# Patient Record
Sex: MALE | Employment: OTHER | URBAN - METROPOLITAN AREA
[De-identification: names, ages, dates, MRNs, and addresses within clinical notes are randomized per-mention and may not be internally consistent; named-entity substitution may affect disease eponyms.]

---

## 2017-06-20 ENCOUNTER — HOSPITAL ENCOUNTER (OUTPATIENT)
Dept: MRI IMAGING | Facility: CLINIC | Age: 82
Discharge: HOME | End: 2017-06-20
Attending: PHYSICIAN ASSISTANT | Admitting: PHYSICIAN ASSISTANT
Payer: MEDICARE

## 2017-06-20 DIAGNOSIS — K51.00 ULCERATIVE PANCOLITIS (H): ICD-10-CM

## 2017-06-20 LAB
CREAT BLD-MCNC: 1.1 MG/DL (ref 0.66–1.25)
GFR SERPL CREATININE-BSD FRML MDRD: 65 ML/MIN/1.7M2

## 2017-06-20 PROCEDURE — 82565 ASSAY OF CREATININE: CPT

## 2017-06-20 PROCEDURE — 25000128 H RX IP 250 OP 636: Performed by: PHYSICIAN ASSISTANT

## 2017-06-20 PROCEDURE — A9585 GADOBUTROL INJECTION: HCPCS | Performed by: PHYSICIAN ASSISTANT

## 2017-06-20 PROCEDURE — 25000132 ZZH RX MED GY IP 250 OP 250 PS 637: Mod: GY

## 2017-06-20 PROCEDURE — 72197 MRI PELVIS W/O & W/DYE: CPT

## 2017-06-20 RX ORDER — GADOBUTROL 604.72 MG/ML
15 INJECTION INTRAVENOUS ONCE
Status: COMPLETED | OUTPATIENT
Start: 2017-06-20 | End: 2017-06-20

## 2017-06-20 RX ADMIN — HYOSCYAMINE SULFATE 250 MCG: 0.12 TABLET ORAL at 10:17

## 2017-06-20 RX ADMIN — GADOBUTROL 15 ML: 604.72 INJECTION INTRAVENOUS at 10:17

## 2018-08-01 ENCOUNTER — HOSPITAL ENCOUNTER (OUTPATIENT)
Facility: CLINIC | Age: 83
Discharge: HOME | End: 2018-08-01
Attending: INTERNAL MEDICINE | Admitting: INTERNAL MEDICINE
Payer: MEDICARE

## 2018-08-01 VITALS
SYSTOLIC BLOOD PRESSURE: 115 MMHG | HEIGHT: 69 IN | OXYGEN SATURATION: 97 % | DIASTOLIC BLOOD PRESSURE: 68 MMHG | RESPIRATION RATE: 20 BRPM

## 2018-08-01 LAB — COLONOSCOPY: NORMAL

## 2018-08-01 PROCEDURE — 25000128 H RX IP 250 OP 636: Performed by: INTERNAL MEDICINE

## 2018-08-01 PROCEDURE — 88305 TISSUE EXAM BY PATHOLOGIST: CPT | Mod: 26 | Performed by: INTERNAL MEDICINE

## 2018-08-01 PROCEDURE — G0500 MOD SEDAT ENDO SERVICE >5YRS: HCPCS | Performed by: INTERNAL MEDICINE

## 2018-08-01 PROCEDURE — 45380 COLONOSCOPY AND BIOPSY: CPT | Performed by: INTERNAL MEDICINE

## 2018-08-01 PROCEDURE — 88305 TISSUE EXAM BY PATHOLOGIST: CPT | Performed by: INTERNAL MEDICINE

## 2018-08-01 RX ORDER — LIDOCAINE 40 MG/G
CREAM TOPICAL
Status: DISCONTINUED | OUTPATIENT
Start: 2018-08-01 | End: 2018-08-01 | Stop reason: HOSPADM

## 2018-08-01 RX ORDER — FLUMAZENIL 0.1 MG/ML
0.2 INJECTION, SOLUTION INTRAVENOUS
Status: DISCONTINUED | OUTPATIENT
Start: 2018-08-01 | End: 2018-08-01 | Stop reason: HOSPADM

## 2018-08-01 RX ORDER — NALOXONE HYDROCHLORIDE 0.4 MG/ML
.1-.4 INJECTION, SOLUTION INTRAMUSCULAR; INTRAVENOUS; SUBCUTANEOUS
Status: DISCONTINUED | OUTPATIENT
Start: 2018-08-01 | End: 2018-08-01 | Stop reason: HOSPADM

## 2018-08-01 RX ORDER — ONDANSETRON 2 MG/ML
4 INJECTION INTRAMUSCULAR; INTRAVENOUS
Status: DISCONTINUED | OUTPATIENT
Start: 2018-08-01 | End: 2018-08-01 | Stop reason: HOSPADM

## 2018-08-01 RX ORDER — ONDANSETRON 4 MG/1
4 TABLET, ORALLY DISINTEGRATING ORAL EVERY 6 HOURS PRN
Status: DISCONTINUED | OUTPATIENT
Start: 2018-08-01 | End: 2018-08-01 | Stop reason: HOSPADM

## 2018-08-01 RX ORDER — FENTANYL CITRATE 50 UG/ML
INJECTION, SOLUTION INTRAMUSCULAR; INTRAVENOUS PRN
Status: DISCONTINUED | OUTPATIENT
Start: 2018-08-01 | End: 2018-08-01 | Stop reason: HOSPADM

## 2018-08-01 RX ORDER — ONDANSETRON 2 MG/ML
4 INJECTION INTRAMUSCULAR; INTRAVENOUS EVERY 6 HOURS PRN
Status: DISCONTINUED | OUTPATIENT
Start: 2018-08-01 | End: 2018-08-01 | Stop reason: HOSPADM

## 2018-08-01 NOTE — CONSULTS
"Pre-Endoscopy History and Physical     Shaan Espinoza MRN# [id number]   YOB: 1935 Age: 82 year old     Date of Procedure: 8/1/2018  Primary care provider: Eli Juárez  Type of Endoscopy: colonoscopy  Reason for Procedure: ulcerative pancolitis  Type of Anesthesia Anticipated: Moderate (conscious) sedation    HPI:    Shaan is a 82 year old male who will be undergoing the above procedure.      A history and physical has been performed. The patient's medications and allergies have been reviewed. The risks and benefits of the procedure and the sedation options and risks were discussed with the patient.  All questions were answered and informed consent was obtained.      He denies a personal or family history of anesthesia complications or bleeding disorders.     Allergies   Allergen Reactions     Codeine         Current Facility-Administered Medications   Medication     0.9% sodium chloride BOLUS     lidocaine (LMX4) kit     lidocaine 1 % 1 mL     ondansetron (ZOFRAN) injection 4 mg     sodium chloride (PF) 0.9% PF flush 3 mL     sodium chloride (PF) 0.9% PF flush 3 mL     sodium chloride (PF) 0.9% PF flush 3 mL       Patient Active Problem List   Diagnosis     SBO (small bowel obstruction)        Past Medical History:   Diagnosis Date     Colitis      Ileus (H)      Neuropathy         History reviewed. No pertinent surgical history.    Social History   Substance Use Topics     Smoking status: Former Smoker     Quit date: 10/4/1962     Smokeless tobacco: Never Used     Alcohol use Yes       History reviewed. No pertinent family history.    REVIEW OF SYSTEMS:     5 point ROS negative except as noted above in HPI, including Gen., Resp., CV, GI &  system review.    PHYSICAL EXAM:   Ht 1.753 m (5' 9\") Estimated body mass index is 25.1 kg/(m^2) as calculated from the following:    Height as of 10/4/15: 1.753 m (5' 9\").    Weight as of 10/4/15: 77.1 kg (170 lb).   GENERAL APPEARANCE: healthy  MENTAL " STATUS: alert  AIRWAY EXAM: Mallampatti Class I (visualization of the soft palate, fauces, uvula, anterior and posterior pillars)  RESP: lungs clear to auscultation - no rales, rhonchi or wheezes  CV: regular rates and rhythm    DIAGNOSTICS:      Not indicated    IMPRESSION     ASA Class 2 - Mild systemic disease    PLAN:     Colonoscopy    The above has been forwarded to the consulting provider.    Signed Electronically by: Carlos Whaley  August 1, 2018

## 2018-08-02 LAB — COPATH REPORT: NORMAL

## 2018-08-11 ENCOUNTER — APPOINTMENT (OUTPATIENT)
Dept: CT IMAGING | Facility: CLINIC | Age: 83
DRG: 389 | End: 2018-08-11
Attending: EMERGENCY MEDICINE
Payer: MEDICARE

## 2018-08-11 ENCOUNTER — HOSPITAL ENCOUNTER (INPATIENT)
Facility: CLINIC | Age: 83
LOS: 4 days | Discharge: HOME | DRG: 389 | End: 2018-08-15
Attending: EMERGENCY MEDICINE | Admitting: INTERNAL MEDICINE
Payer: MEDICARE

## 2018-08-11 DIAGNOSIS — K56.609 SBO (SMALL BOWEL OBSTRUCTION) (H): ICD-10-CM

## 2018-08-11 LAB
ALBUMIN SERPL-MCNC: 4.5 G/DL (ref 3.4–5)
ALBUMIN UR-MCNC: NEGATIVE MG/DL
ALP SERPL-CCNC: 99 U/L (ref 40–150)
ALT SERPL W P-5'-P-CCNC: 24 U/L (ref 0–70)
AMORPH CRY #/AREA URNS HPF: ABNORMAL /HPF
ANION GAP SERPL CALCULATED.3IONS-SCNC: 10 MMOL/L (ref 3–14)
APPEARANCE UR: ABNORMAL
AST SERPL W P-5'-P-CCNC: 28 U/L (ref 0–45)
BASOPHILS # BLD AUTO: 0 10E9/L (ref 0–0.2)
BASOPHILS NFR BLD AUTO: 0.2 %
BILIRUB SERPL-MCNC: 1.3 MG/DL (ref 0.2–1.3)
BILIRUB UR QL STRIP: NEGATIVE
BUN SERPL-MCNC: 20 MG/DL (ref 7–30)
CALCIUM SERPL-MCNC: 9.6 MG/DL (ref 8.5–10.1)
CHLORIDE SERPL-SCNC: 98 MMOL/L (ref 94–109)
CO2 BLDCOV-SCNC: 23 MMOL/L (ref 21–28)
CO2 SERPL-SCNC: 25 MMOL/L (ref 20–32)
COLOR UR AUTO: YELLOW
CREAT SERPL-MCNC: 0.94 MG/DL (ref 0.66–1.25)
DIFFERENTIAL METHOD BLD: ABNORMAL
EOSINOPHIL # BLD AUTO: 0 10E9/L (ref 0–0.7)
EOSINOPHIL NFR BLD AUTO: 0.1 %
ERYTHROCYTE [DISTWIDTH] IN BLOOD BY AUTOMATED COUNT: 11.9 % (ref 10–15)
GFR SERPL CREATININE-BSD FRML MDRD: 77 ML/MIN/1.7M2
GLUCOSE SERPL-MCNC: 169 MG/DL (ref 70–99)
GLUCOSE UR STRIP-MCNC: NEGATIVE MG/DL
HCT VFR BLD AUTO: 47.8 % (ref 40–53)
HGB BLD-MCNC: 16.9 G/DL (ref 13.3–17.7)
HGB UR QL STRIP: NEGATIVE
HYALINE CASTS #/AREA URNS LPF: 1 /LPF (ref 0–2)
IMM GRANULOCYTES # BLD: 0.1 10E9/L (ref 0–0.4)
IMM GRANULOCYTES NFR BLD: 0.5 %
KETONES UR STRIP-MCNC: 20 MG/DL
LACTATE BLD-SCNC: 1.6 MMOL/L (ref 0.7–2.1)
LACTATE BLD-SCNC: 2.8 MMOL/L (ref 0.7–2)
LEUKOCYTE ESTERASE UR QL STRIP: NEGATIVE
LIPASE SERPL-CCNC: 180 U/L (ref 73–393)
LYMPHOCYTES # BLD AUTO: 0.6 10E9/L (ref 0.8–5.3)
LYMPHOCYTES NFR BLD AUTO: 4.8 %
MCH RBC QN AUTO: 32.9 PG (ref 26.5–33)
MCHC RBC AUTO-ENTMCNC: 35.4 G/DL (ref 31.5–36.5)
MCV RBC AUTO: 93 FL (ref 78–100)
MONOCYTES # BLD AUTO: 0.4 10E9/L (ref 0–1.3)
MONOCYTES NFR BLD AUTO: 3.4 %
MUCOUS THREADS #/AREA URNS LPF: PRESENT /LPF
NEUTROPHILS # BLD AUTO: 11.4 10E9/L (ref 1.6–8.3)
NEUTROPHILS NFR BLD AUTO: 91 %
NITRATE UR QL: NEGATIVE
NRBC # BLD AUTO: 0 10*3/UL
NRBC BLD AUTO-RTO: 0 /100
PCO2 BLDV: 35 MM HG (ref 40–50)
PH BLDV: 7.42 PH (ref 7.32–7.43)
PH UR STRIP: 8 PH (ref 5–7)
PLATELET # BLD AUTO: 223 10E9/L (ref 150–450)
PO2 BLDV: 34 MM HG (ref 25–47)
POTASSIUM SERPL-SCNC: 3.6 MMOL/L (ref 3.4–5.3)
PROT SERPL-MCNC: 8.5 G/DL (ref 6.8–8.8)
RBC # BLD AUTO: 5.14 10E12/L (ref 4.4–5.9)
RBC #/AREA URNS AUTO: 2 /HPF (ref 0–2)
SAO2 % BLDV FROM PO2: 67 %
SODIUM SERPL-SCNC: 133 MMOL/L (ref 133–144)
SOURCE: ABNORMAL
SP GR UR STRIP: 1.02 (ref 1–1.03)
UROBILINOGEN UR STRIP-MCNC: 0 MG/DL (ref 0–2)
WBC # BLD AUTO: 12.5 10E9/L (ref 4–11)
WBC #/AREA URNS AUTO: 0 /HPF (ref 0–5)

## 2018-08-11 PROCEDURE — 96376 TX/PRO/DX INJ SAME DRUG ADON: CPT

## 2018-08-11 PROCEDURE — 12000000 ZZH R&B MED SURG/OB

## 2018-08-11 PROCEDURE — 99222 1ST HOSP IP/OBS MODERATE 55: CPT | Mod: AI | Performed by: INTERNAL MEDICINE

## 2018-08-11 PROCEDURE — 80053 COMPREHEN METABOLIC PANEL: CPT | Performed by: EMERGENCY MEDICINE

## 2018-08-11 PROCEDURE — 99285 EMERGENCY DEPT VISIT HI MDM: CPT | Mod: 25

## 2018-08-11 PROCEDURE — 25000128 H RX IP 250 OP 636: Performed by: EMERGENCY MEDICINE

## 2018-08-11 PROCEDURE — 96361 HYDRATE IV INFUSION ADD-ON: CPT

## 2018-08-11 PROCEDURE — 81001 URINALYSIS AUTO W/SCOPE: CPT | Performed by: EMERGENCY MEDICINE

## 2018-08-11 PROCEDURE — 85025 COMPLETE CBC W/AUTO DIFF WBC: CPT | Performed by: EMERGENCY MEDICINE

## 2018-08-11 PROCEDURE — 83605 ASSAY OF LACTIC ACID: CPT

## 2018-08-11 PROCEDURE — 25000128 H RX IP 250 OP 636: Performed by: INTERNAL MEDICINE

## 2018-08-11 PROCEDURE — 82803 BLOOD GASES ANY COMBINATION: CPT

## 2018-08-11 PROCEDURE — 96375 TX/PRO/DX INJ NEW DRUG ADDON: CPT

## 2018-08-11 PROCEDURE — 96374 THER/PROPH/DIAG INJ IV PUSH: CPT | Mod: 59

## 2018-08-11 PROCEDURE — 83605 ASSAY OF LACTIC ACID: CPT | Performed by: EMERGENCY MEDICINE

## 2018-08-11 PROCEDURE — 83690 ASSAY OF LIPASE: CPT | Performed by: EMERGENCY MEDICINE

## 2018-08-11 PROCEDURE — 74177 CT ABD & PELVIS W/CONTRAST: CPT

## 2018-08-11 RX ORDER — PROCHLORPERAZINE 25 MG
12.5 SUPPOSITORY, RECTAL RECTAL EVERY 12 HOURS PRN
Status: DISCONTINUED | OUTPATIENT
Start: 2018-08-11 | End: 2018-08-15 | Stop reason: HOSPADM

## 2018-08-11 RX ORDER — ACETAMINOPHEN 325 MG/1
650 TABLET ORAL EVERY 4 HOURS PRN
Status: DISCONTINUED | OUTPATIENT
Start: 2018-08-11 | End: 2018-08-15 | Stop reason: HOSPADM

## 2018-08-11 RX ORDER — LATANOPROST 50 UG/ML
1 SOLUTION/ DROPS OPHTHALMIC DAILY
COMMUNITY

## 2018-08-11 RX ORDER — SODIUM CHLORIDE, SODIUM LACTATE, POTASSIUM CHLORIDE, CALCIUM CHLORIDE 600; 310; 30; 20 MG/100ML; MG/100ML; MG/100ML; MG/100ML
INJECTION, SOLUTION INTRAVENOUS CONTINUOUS
Status: DISCONTINUED | OUTPATIENT
Start: 2018-08-11 | End: 2018-08-15

## 2018-08-11 RX ORDER — NALOXONE HYDROCHLORIDE 0.4 MG/ML
.1-.4 INJECTION, SOLUTION INTRAMUSCULAR; INTRAVENOUS; SUBCUTANEOUS
Status: DISCONTINUED | OUTPATIENT
Start: 2018-08-11 | End: 2018-08-15 | Stop reason: HOSPADM

## 2018-08-11 RX ORDER — SODIUM CHLORIDE 9 MG/ML
1000 INJECTION, SOLUTION INTRAVENOUS CONTINUOUS
Status: DISCONTINUED | OUTPATIENT
Start: 2018-08-11 | End: 2018-08-11

## 2018-08-11 RX ORDER — ONDANSETRON 4 MG/1
4 TABLET, ORALLY DISINTEGRATING ORAL EVERY 6 HOURS PRN
Status: DISCONTINUED | OUTPATIENT
Start: 2018-08-11 | End: 2018-08-15 | Stop reason: HOSPADM

## 2018-08-11 RX ORDER — HYDROMORPHONE HYDROCHLORIDE 1 MG/ML
.3-.5 INJECTION, SOLUTION INTRAMUSCULAR; INTRAVENOUS; SUBCUTANEOUS
Status: DISCONTINUED | OUTPATIENT
Start: 2018-08-11 | End: 2018-08-15 | Stop reason: HOSPADM

## 2018-08-11 RX ORDER — IOPAMIDOL 755 MG/ML
500 INJECTION, SOLUTION INTRAVASCULAR ONCE
Status: COMPLETED | OUTPATIENT
Start: 2018-08-11 | End: 2018-08-11

## 2018-08-11 RX ORDER — ONDANSETRON 2 MG/ML
4 INJECTION INTRAMUSCULAR; INTRAVENOUS EVERY 30 MIN PRN
Status: DISCONTINUED | OUTPATIENT
Start: 2018-08-11 | End: 2018-08-11

## 2018-08-11 RX ORDER — MESALAMINE 500 MG/1
1000 CAPSULE, EXTENDED RELEASE ORAL EVERY MORNING
COMMUNITY

## 2018-08-11 RX ORDER — LIDOCAINE 40 MG/G
CREAM TOPICAL
Status: DISCONTINUED | OUTPATIENT
Start: 2018-08-11 | End: 2018-08-15 | Stop reason: HOSPADM

## 2018-08-11 RX ORDER — ONDANSETRON 2 MG/ML
4 INJECTION INTRAMUSCULAR; INTRAVENOUS EVERY 6 HOURS PRN
Status: DISCONTINUED | OUTPATIENT
Start: 2018-08-11 | End: 2018-08-15 | Stop reason: HOSPADM

## 2018-08-11 RX ORDER — ONDANSETRON 2 MG/ML
4 INJECTION INTRAMUSCULAR; INTRAVENOUS EVERY 30 MIN PRN
Status: COMPLETED | OUTPATIENT
Start: 2018-08-11 | End: 2018-08-11

## 2018-08-11 RX ORDER — OXYCODONE HYDROCHLORIDE 5 MG/1
5-10 TABLET ORAL EVERY 4 HOURS PRN
Status: DISCONTINUED | OUTPATIENT
Start: 2018-08-11 | End: 2018-08-15 | Stop reason: HOSPADM

## 2018-08-11 RX ORDER — HYDROMORPHONE HYDROCHLORIDE 1 MG/ML
0.5 INJECTION, SOLUTION INTRAMUSCULAR; INTRAVENOUS; SUBCUTANEOUS
Status: DISCONTINUED | OUTPATIENT
Start: 2018-08-11 | End: 2018-08-11

## 2018-08-11 RX ORDER — MESALAMINE 500 MG/1
1000 CAPSULE, EXTENDED RELEASE ORAL AT BEDTIME
COMMUNITY

## 2018-08-11 RX ORDER — PROCHLORPERAZINE MALEATE 5 MG
5 TABLET ORAL EVERY 6 HOURS PRN
Status: DISCONTINUED | OUTPATIENT
Start: 2018-08-11 | End: 2018-08-15 | Stop reason: HOSPADM

## 2018-08-11 RX ADMIN — ONDANSETRON 4 MG: 2 INJECTION INTRAMUSCULAR; INTRAVENOUS at 21:06

## 2018-08-11 RX ADMIN — SODIUM CHLORIDE 1000 ML: 9 INJECTION, SOLUTION INTRAVENOUS at 19:40

## 2018-08-11 RX ADMIN — Medication 0.5 MG: at 22:59

## 2018-08-11 RX ADMIN — IOPAMIDOL 83 ML: 755 INJECTION, SOLUTION INTRAVENOUS at 20:41

## 2018-08-11 RX ADMIN — ONDANSETRON 4 MG: 2 INJECTION INTRAMUSCULAR; INTRAVENOUS at 21:58

## 2018-08-11 RX ADMIN — SODIUM CHLORIDE 61 ML: 900 INJECTION, SOLUTION INTRAVENOUS at 20:43

## 2018-08-11 RX ADMIN — ONDANSETRON 4 MG: 2 INJECTION INTRAMUSCULAR; INTRAVENOUS at 19:43

## 2018-08-11 RX ADMIN — SODIUM CHLORIDE, POTASSIUM CHLORIDE, SODIUM LACTATE AND CALCIUM CHLORIDE: 600; 310; 30; 20 INJECTION, SOLUTION INTRAVENOUS at 22:52

## 2018-08-11 RX ADMIN — Medication 0.5 MG: at 19:44

## 2018-08-11 RX ADMIN — SODIUM CHLORIDE, POTASSIUM CHLORIDE, SODIUM LACTATE AND CALCIUM CHLORIDE 1000 ML: 600; 310; 30; 20 INJECTION, SOLUTION INTRAVENOUS at 20:27

## 2018-08-11 RX ADMIN — ONDANSETRON 4 MG: 2 INJECTION INTRAMUSCULAR; INTRAVENOUS at 22:49

## 2018-08-11 ASSESSMENT — ENCOUNTER SYMPTOMS
ROS GI COMMENTS: NEGATIVE: HEMATEMESIS
NAUSEA: 1
DIARRHEA: 1
ABDOMINAL PAIN: 1
VOMITING: 1
BLOOD IN STOOL: 0

## 2018-08-11 NOTE — IP AVS SNAPSHOT
[place] [address] [facility]    [address]    [facility] [address]    Phone:  [phone]    Fax:  [phone]                                       After Visit Summary   8/11/2018    Shaan Espinoza    MRN: [id number]           After Visit Summary Signature Page     I have received my discharge instructions, and my questions have been answered. I have discussed any challenges I see with this plan with the nurse or doctor.    ..........................................................................................................................................  Patient/Patient Representative Signature      ..........................................................................................................................................  Patient Representative Print Name and Relationship to Patient    ..................................................               ................................................  Date                                            Time    ..........................................................................................................................................  Reviewed by Signature/Title    ...................................................              ..............................................  Date                                                            Time

## 2018-08-11 NOTE — IP AVS SNAPSHOT
MRN:[id number]                      After Visit Summary   8/11/2018    Shaan Espinoza    MRN: [id number]           Thank you!     Thank you for choosing [facility] for your care. Our goal is always to provide you with excellent care. Hearing back from our patients is one way we can continue to improve our services. Please take a few minutes to complete the written survey that you may receive in the mail after you visit. If you would like to speak to someone directly about your visit please contact Patient Relations at [phone]. Thank you!          Patient Information     Date Of Birth          1935        Designated Caregiver       Most Recent Value    Caregiver    Will someone help with your care after discharge? no      About your hospital stay     You were admitted on:  August 11, 2018 You last received care in the:  [place] [facility] [facility]    You were discharged on:  August 15, 2018       Who to Call     For medical emergencies, please call 911.  For non-urgent questions about your medical care, please call your primary care provider or clinic, [phone]          Attending Provider     Provider Specialty    Merrill Hua MD Emergency Medicine    Anila, Kristin Wright MD Internal Medicine       Primary Care Provider Office Phone # Fax #    Eli Juárez [phone]      After Care Instructions     Activity       Your activity upon discharge: activity as tolerated            Diet       Follow this diet upon discharge: low fiber diet for 2 weeks.                  Follow-up Appointments     Follow-up and recommended labs and tests        Follow up with primary care provider, Eli Juárez, within 7 days for hospital follow- up.  No follow up labs or test are needed.                  Further instructions from your care team       HOME CARE FOLLOWING BOWEL OBSTRUCTION ADMISSION  GAVIN Kaiser N. Guttormson, D. Maurer, R.  "O Todd     ACTIVITY:  Light Activity -- you may immediately be up and about as tolerated.  Driving -- you may drive when comfortable and off narcotic pain medications.  Light Work -- resume when comfortable off pain medications.  (If you can drive, you probably can work.)  Strenuous Work/Activity -- limit lifting to 15 pounds for 1-2 weeks.  Then, progressively increase with time.  Active Sports (running, biking, etc.) -- cautiously resume after 4 weeks, or when cleared by your surgeon.    DISCOMFORT:  Expect gradual improvement of residual abdominal soreness as your bowel function continues to return to normal over the following 1-2 weeks.  Please contact the office if you have worsening of abdominal pain, or onset of nausea/vomiting.    DIET:  Continue on a \"soft\" diet (i.e. cooked vegetables, soups, processed meats, light/white bread, mashed potatoes, rice, yogurt) for the first one to two week after discharge from the hospital.  After this time, you may return to diet you were on before surgery minimizing high cellulose foods and foods with \"skins\" (i.e.grapes, apple, citrus, corn) only.  This would include limiting: brussel sprouts, cabbage & kale, alfalfa sprouts, zucchini, squash, potatoes, carrots, and yams.  Drink plenty of fluids.    SURGEON CONTACT/APPOINTMENTS:  Office Phone:  [phone]     CONTACT US IF THE FOLLOWING DEVELOPS:   1. A fever that is above 101     2. Severe pain that is not relieved by your prescription.        If you have other questions, please call the office Monday thru Friday between 8am and 5pm to discuss with the nurse or physician assistant.  #[phone]    There is a surgeon ON CALL on weekday evenings and over the weekend in case of urgent need only, and may be contacted at the same number.    If you are having an emergency, call 911 or proceed to your nearest emergency department.    Pending Results     No orders found from 8/9/2018 to 8/12/2018.            Statement " "of Approval     Ordered          08/15/18 1531  I have reviewed and agree with all the recommendations and orders detailed in this document.  EFFECTIVE NOW     Approved and electronically signed by:  Madison Heard MD             Admission Information     Date & Time Provider Department Dept. Phone    2018 Kristin High MD [place] [address] [facility] [phone]      Your Vitals Were     Blood Pressure Pulse Temperature Respirations Height Weight    123/69 (BP Location: Left arm) 83 97.5  F (36.4  C) (Oral) 16 1.753 m (5' 9\") 80 kg (176 lb 4.8 oz)    Pulse Oximetry BMI (Body Mass Index)                94% 26.03 kg/m2          MyChart Information     [employer] lets you send messages to your doctor, view your test results, renew your prescriptions, schedule appointments and more. To sign up, go to [email or web address] . Click on \"Log in\" on the left side of the screen, which will take you to the Welcome page. Then click on \"Sign up Now\" on the right side of the page.     You will be asked to enter the access code listed below, as well as some personal information. Please follow the directions to create your username and password.     Your access code is: [email or web address]  Expires: 2018  3:34 PM     Your access code will  in 90 days. If you need help or a new code, please call your [place] clinic or [phone].        Care EveryWhere ID     This is your Care EveryWhere ID. This could be used by other organizations to access your [place] medical records  [id number]        Equal Access to Services     [facility] : Segun Naidu, lala rodriguez, juan oreilly. So [facility] [phone].    ATENCIÓN: Si habla español, tiene a denise disposición servicios gratuitos de asistencia lingüística. Llame al [phone].    We comply with applicable federal civil rights laws and [place] laws. We do not discriminate on the " basis of race, color, national origin, age, disability, sex, sexual orientation, or gender identity.               Review of your medicines      CONTINUE these medicines which have NOT CHANGED        Dose / Directions    calcium carbonate 500 MG tablet   Commonly known as:  OS-RISSA 500 mg [place]. [place]        Dose:  500 mg   Take 500 mg by mouth daily   Refills:  0       glucosamine-chondroitin 500-400 MG Caps per capsule        Dose:  1 capsule   Take 1 capsule by mouth daily   Refills:  0       latanoprost 0.005 % ophthalmic solution   Commonly known as:  XALATAN        Dose:  1 drop   Place 1 drop into both eyes At Bedtime   Refills:  0       LYRICA PO        Dose:  200 mg   Take 200 mg by mouth 2 times daily as needed   Refills:  0       * PENTASA 500 MG Cpcr CR capsule   Generic drug:  mesalamine        Dose:  1000 mg   Take 1,000 mg by mouth At Bedtime   Refills:  0       * PENTASA 500 MG Cpcr CR capsule   Generic drug:  mesalamine        Dose:  1500 mg   Take 1,500 mg by mouth every morning   Refills:  0       * Notice:  This list has 2 medication(s) that are the same as other medications prescribed for you. Read the directions carefully, and ask your doctor or other care provider to review them with you.      STOP taking     fiber modular packet                    Protect others around you: Learn how to safely use, store and throw away your medicines at www.disposemymeds.org.             Medication List: This is a list of all your medications and when to take them. Check marks below indicate your daily home schedule. Keep this list as a reference.      Medications           Morning Afternoon Evening Bedtime As Needed    calcium carbonate 500 MG tablet   Commonly known as:  OS-RISSA 500 mg [place]. [place]   Take 500 mg by mouth daily                                glucosamine-chondroitin 500-400 MG Caps per capsule   Take 1 capsule by mouth daily                                latanoprost 0.005 % ophthalmic solution    Commonly known as:  XALATAN   Place 1 drop into both eyes At Bedtime   Last time this was given:  1 drop on 8/14/2018  8:55 PM   Next Dose Due:  8/15/18 at bedtime                                   LYRICA PO   Take 200 mg by mouth 2 times daily as needed                                   * PENTASA 500 MG Cpcr CR capsule   Take 1,000 mg by mouth At Bedtime   Last time this was given:  1,500 mg on 8/15/2018  8:36 AM   Generic drug:  mesalamine   Next Dose Due:  8/15/18 at bedtime                                   * PENTASA 500 MG Cpcr CR capsule   Take 1,500 mg by mouth every morning   Last time this was given:  1,500 mg on 8/15/2018  8:36 AM   Generic drug:  mesalamine   Next Dose Due:  8/16/18 in the morning                                   * Notice:  This list has 2 medication(s) that are the same as other medications prescribed for you. Read the directions carefully, and ask your doctor or other care provider to review them with you.

## 2018-08-12 ENCOUNTER — APPOINTMENT (OUTPATIENT)
Dept: GENERAL RADIOLOGY | Facility: CLINIC | Age: 83
DRG: 389 | End: 2018-08-12
Attending: INTERNAL MEDICINE
Payer: MEDICARE

## 2018-08-12 LAB
ANION GAP SERPL CALCULATED.3IONS-SCNC: 8 MMOL/L (ref 3–14)
BASOPHILS # BLD AUTO: 0 10E9/L (ref 0–0.2)
BASOPHILS NFR BLD AUTO: 0.2 %
BUN SERPL-MCNC: 14 MG/DL (ref 7–30)
CALCIUM SERPL-MCNC: 9 MG/DL (ref 8.5–10.1)
CHLORIDE SERPL-SCNC: 106 MMOL/L (ref 94–109)
CO2 SERPL-SCNC: 25 MMOL/L (ref 20–32)
CREAT SERPL-MCNC: 1.01 MG/DL (ref 0.66–1.25)
DIFFERENTIAL METHOD BLD: ABNORMAL
EOSINOPHIL # BLD AUTO: 0 10E9/L (ref 0–0.7)
EOSINOPHIL NFR BLD AUTO: 0.2 %
ERYTHROCYTE [DISTWIDTH] IN BLOOD BY AUTOMATED COUNT: 12.2 % (ref 10–15)
GFR SERPL CREATININE-BSD FRML MDRD: 71 ML/MIN/1.7M2
GLUCOSE SERPL-MCNC: 107 MG/DL (ref 70–99)
HCT VFR BLD AUTO: 46.4 % (ref 40–53)
HGB BLD-MCNC: 16 G/DL (ref 13.3–17.7)
IMM GRANULOCYTES # BLD: 0.1 10E9/L (ref 0–0.4)
IMM GRANULOCYTES NFR BLD: 0.4 %
LYMPHOCYTES # BLD AUTO: 1.9 10E9/L (ref 0.8–5.3)
LYMPHOCYTES NFR BLD AUTO: 15.3 %
MCH RBC QN AUTO: 33.1 PG (ref 26.5–33)
MCHC RBC AUTO-ENTMCNC: 34.5 G/DL (ref 31.5–36.5)
MCV RBC AUTO: 96 FL (ref 78–100)
MONOCYTES # BLD AUTO: 1.2 10E9/L (ref 0–1.3)
MONOCYTES NFR BLD AUTO: 9.7 %
NEUTROPHILS # BLD AUTO: 9 10E9/L (ref 1.6–8.3)
NEUTROPHILS NFR BLD AUTO: 74.2 %
NRBC # BLD AUTO: 0 10*3/UL
NRBC BLD AUTO-RTO: 0 /100
PLATELET # BLD AUTO: 227 10E9/L (ref 150–450)
POTASSIUM SERPL-SCNC: 4.3 MMOL/L (ref 3.4–5.3)
RBC # BLD AUTO: 4.83 10E12/L (ref 4.4–5.9)
SODIUM SERPL-SCNC: 139 MMOL/L (ref 133–144)
WBC # BLD AUTO: 12.1 10E9/L (ref 4–11)

## 2018-08-12 PROCEDURE — 99232 SBSQ HOSP IP/OBS MODERATE 35: CPT | Performed by: HOSPITALIST

## 2018-08-12 PROCEDURE — 12000000 ZZH R&B MED SURG/OB

## 2018-08-12 PROCEDURE — 80048 BASIC METABOLIC PNL TOTAL CA: CPT | Performed by: INTERNAL MEDICINE

## 2018-08-12 PROCEDURE — 74019 RADEX ABDOMEN 2 VIEWS: CPT

## 2018-08-12 PROCEDURE — 36415 COLL VENOUS BLD VENIPUNCTURE: CPT | Performed by: INTERNAL MEDICINE

## 2018-08-12 PROCEDURE — 99221 1ST HOSP IP/OBS SF/LOW 40: CPT | Performed by: SURGERY

## 2018-08-12 PROCEDURE — 25000128 H RX IP 250 OP 636: Performed by: INTERNAL MEDICINE

## 2018-08-12 PROCEDURE — 99207 ZZC CDG-MDM COMPONENT: MEETS LOW - DOWN CODED: CPT | Performed by: HOSPITALIST

## 2018-08-12 PROCEDURE — 85025 COMPLETE CBC W/AUTO DIFF WBC: CPT | Performed by: INTERNAL MEDICINE

## 2018-08-12 PROCEDURE — 25000128 H RX IP 250 OP 636: Performed by: HOSPITALIST

## 2018-08-12 RX ORDER — LORAZEPAM 2 MG/ML
.5-1 INJECTION INTRAMUSCULAR
Status: COMPLETED | OUTPATIENT
Start: 2018-08-12 | End: 2018-08-12

## 2018-08-12 RX ORDER — LATANOPROST 50 UG/ML
1 SOLUTION/ DROPS OPHTHALMIC AT BEDTIME
Status: DISCONTINUED | OUTPATIENT
Start: 2018-08-12 | End: 2018-08-15 | Stop reason: HOSPADM

## 2018-08-12 RX ORDER — PREGABALIN 100 MG/1
200 CAPSULE ORAL 2 TIMES DAILY PRN
Status: DISCONTINUED | OUTPATIENT
Start: 2018-08-12 | End: 2018-08-15 | Stop reason: HOSPADM

## 2018-08-12 RX ADMIN — SODIUM CHLORIDE, POTASSIUM CHLORIDE, SODIUM LACTATE AND CALCIUM CHLORIDE: 600; 310; 30; 20 INJECTION, SOLUTION INTRAVENOUS at 06:51

## 2018-08-12 RX ADMIN — PROCHLORPERAZINE EDISYLATE 5 MG: 5 INJECTION INTRAMUSCULAR; INTRAVENOUS at 10:05

## 2018-08-12 RX ADMIN — ONDANSETRON 4 MG: 2 INJECTION INTRAMUSCULAR; INTRAVENOUS at 15:13

## 2018-08-12 RX ADMIN — SODIUM CHLORIDE, POTASSIUM CHLORIDE, SODIUM LACTATE AND CALCIUM CHLORIDE: 600; 310; 30; 20 INJECTION, SOLUTION INTRAVENOUS at 22:50

## 2018-08-12 RX ADMIN — Medication 0.5 MG: at 10:16

## 2018-08-12 RX ADMIN — PROCHLORPERAZINE EDISYLATE 5 MG: 5 INJECTION INTRAMUSCULAR; INTRAVENOUS at 01:55

## 2018-08-12 RX ADMIN — LORAZEPAM 1 MG: 2 INJECTION INTRAMUSCULAR; INTRAVENOUS at 11:41

## 2018-08-12 RX ADMIN — Medication 0.5 MG: at 04:59

## 2018-08-12 RX ADMIN — SODIUM CHLORIDE, POTASSIUM CHLORIDE, SODIUM LACTATE AND CALCIUM CHLORIDE: 600; 310; 30; 20 INJECTION, SOLUTION INTRAVENOUS at 14:48

## 2018-08-12 RX ADMIN — ONDANSETRON 4 MG: 2 INJECTION INTRAMUSCULAR; INTRAVENOUS at 04:56

## 2018-08-12 ASSESSMENT — ACTIVITIES OF DAILY LIVING (ADL)
ADLS_ACUITY_SCORE: 9
ADLS_ACUITY_SCORE: 11
ADLS_ACUITY_SCORE: 11
ADLS_ACUITY_SCORE: 9

## 2018-08-12 NOTE — PHARMACY-ADMISSION MEDICATION HISTORY
Admission medication history interview status for this patient is complete. See [facility] admission navigator for allergy information, prior to admission medications and immunization status.     Medication history interview source(s):Patient and Family  Medication history resources (including written lists, pill bottles, clinic record):None  [facility]    Changes made to PTA medication list:  Added: latanoprost eye gtts  Deleted: mvi  Changed: Mesalamine to 1500 mg in AM and 1000 mg in PM, Lyrica to prn    Actions taken by pharmacist (provider contacted, etc):None     Additional medication history information:None    Medication reconciliation/reorder completed by provider prior to medication history? No    Do you take OTC medications (eg tylenol, ibuprofen, fish oil, eye/ear drops, etc)? yes(Y/N)    For patients on insulin therapy: no (Y/N)  Lantus/levemir/NPH/Mix 70/30 dose:   (Y/N) (see Med list for doses)   Sliding scale Novolog Y/N  If Yes, do you have a baseline novolog pre-meal dose:  units with meals  Patients eat three meals a day:   Y/N    How many episodes of hypoglycemia do you have per week: _______  How many missed doses do you have per week: ______  How many times do you check your blood glucose per day: _______   Any Barriers to therapy - Be specific :  cost of medications, comfortable with giving injections (if applicable), comfortable and confident with current diabetes regimen: Y/N ______________      Prior to Admission medications    Medication Sig Last Dose Taking? Auth Provider   calcium carbonate (OS-RISSA 500 MG [place]. [place]) 500 MG tablet Take 500 mg by mouth daily  8/11/2018 at Unknown time Yes Reported, Patient   fiber modular (NUTRISOURCE FIBER) packet 1 packet daily with food  8/11/2018 at Unknown time Yes Reported, Patient   glucosamine-chondroitin 500-400 MG CAPS Take 1 capsule by mouth daily 8/11/2018 at Unknown time Yes Reported, Patient   latanoprost (XALATAN) 0.005 %  ophthalmic solution Place 1 drop into both eyes At Bedtime 8/10/2018 at Unknown time Yes Unknown, Entered By History   mesalamine (PENTASA) 500 MG CPCR CR capsule Take 1,000 mg by mouth At Bedtime 8/10/2018 at Unknown time Yes Unknown, Entered By History   mesalamine (PENTASA) 500 MG CPCR CR capsule Take 1,500 mg by mouth every morning 8/11/2018 at Unknown time Yes Unknown, Entered By History   Pregabalin (LYRICA PO) Take 200 mg by mouth 2 times daily as needed    Reported, Patient

## 2018-08-12 NOTE — PLAN OF CARE
Problem: Bowel Obstruction (Adult)  Goal: Signs and Symptoms of Listed Potential Problems Will be Absent, Minimized or Managed (Bowel Obstruction)  Signs and symptoms of listed potential problems will be absent, minimized or managed by discharge/transition of care (reference Bowel Obstruction (Adult) CPG).  Outcome: No Change  Ambulatory Status:  Pt up SBA.  VS:  Elevated BPs  Pain:  C/O  Resp: LS CTA  GI:  Having nausea.  NPO with ice.  BS hypo.    Last BM 1630  :  Voiding adequately  Skin:  WDL  Tx:  NPO with LR @125  Disposition:  Xray tomorrow AM     Will continue to monitor and provide supportive cares.

## 2018-08-12 NOTE — PLAN OF CARE
Problem: Bowel Obstruction (Adult)  Goal: Signs and Symptoms of Listed Potential Problems Will be Absent, Minimized or Managed (Bowel Obstruction)  Signs and symptoms of listed potential problems will be absent, minimized or managed by discharge/transition of care (reference Bowel Obstruction (Adult) CPG).   Ambulatory Status:  Pt up independently  VS:  vss  Pain:  Dilaudid for pain x1  Resp: LS clear  GI:  Zofran and compazine for nausea.  NPO.  BS hypoactive.  Passing flatus.  Last BM 8/11.  Disposition:  tbd    Repeat abdominal xray done this morning.

## 2018-08-12 NOTE — ED TRIAGE NOTES
Patient complains of vomiting and diarrhea today since 12:00. Abdominal cramping. Hx illus. Hypertensive, all other VSS.

## 2018-08-12 NOTE — PLAN OF CARE
Problem: Bowel Obstruction (Adult)  Goal: Signs and Symptoms of Listed Potential Problems Will be Absent, Minimized or Managed (Bowel Obstruction)  Signs and symptoms of listed potential problems will be absent, minimized or managed by discharge/transition of care (reference Bowel Obstruction (Adult) CPG).   Outcome: No Change  Ambulatory Status:  Pt up standby.  Orientation: a/o  VS:  VSS  Pain:  abd pain-IV dilaudid given   Resp: LS clear.   GI:  c/o nausea prior to NG placement-IV compazine given.  NPO with ice chips. +BS.  Passing flatus.  Last BM 8/11. NG placed this shift with 260 output   :  Voiding without difficulty   Labs:  WBC 12.1  Consults:  Surgery   Disposition:  tbd

## 2018-08-12 NOTE — CONSULTS
General Surgery Consultation    Shaan Espinoza MRN# [id number]   Age: 82 year old YOB: 1935     Date of Admission:  8/11/2018    Reason for consult:            Abdominal pain       Requesting physician:            Dr. High                Assessment and Plan:   Assessment:   Shaan Espinoza is a 82 year old male with a small bowel obstruction.    Comorbidities:   has a past medical history of Colitis; Ileus (H); and Neuropathy.        Plan:   Continue conservative management with bowel rest, npo, IVF, and NG tube decompression.  Surgical intervention may be needed if the clinical picture worsens or if the patient fails to progress with conservative measure.  Plan and option of surgical intervention reviewed with the pt and his wife.  They are agreeable to NG tube placement and continued observation.                 Chief Complaint:   Abdominal pain    History is obtained from the patient and his wife.         History of Present Illness:   Shaan Espinoza is a 82 year old  male without a history of abdominal surgery and with a history of ulcerative pancolitis and recurrent episodes of ileus approximately once yearly who presents with abdominal pain diffusely for the past 20 hours.  The patient had a surveillance colonoscopy 11 days ago which did not show active colitis.  In addition, random mucosal biopsies do not show inflammation.  The pain is constant and sharp and cramping.  Associated symptoms include with nausea and vomiting.  Negative for associated symptoms of diarrhea, fever and chills.  Last bowel movement was yesterday at 4pm, scant and loose.  Last flatus was unknown.  At baseline BMs are formed and q 2-3 days.  He does have a history of bowel obstructions in the past.  He was hospitalized in 2009, 2011, 2015 for sbo's and treated with ng tube decompression.  He has brief episodes not requiring hospitalization q 6mo-1yr that last 2-4 hours.  The pt had an MR enterography 1 yr  "ago which did not reveal any small bowel abnormality.           Past Medical History:    has a past medical history of Colitis; Ileus (H); and Neuropathy.          Past Surgical History:     Past Surgical History:   Procedure Laterality Date     COLONOSCOPY N/A 8/1/2018    Procedure: COMBINED COLONOSCOPY, SINGLE OR MULTIPLE BIOPSY/POLYPECTOMY BY BIOPSY;  Colonoscopy cold forceps biopsies;  Surgeon: Carlos Whaley MD;  Location:  GI             Social History:     Social History   Substance Use Topics     Smoking status: Former Smoker     Quit date: 10/4/1962     Smokeless tobacco: Never Used     Alcohol use Yes             Family History:   Family history reviewed and is not pertinent.         Allergies:   All allergies reviewed and addressed          Medications:     No current facility-administered medications on file prior to encounter.   Current Outpatient Prescriptions on File Prior to Encounter:  calcium carbonate (OS-RISSA 500 MG [place]. [place]) 500 MG tablet Take 500 mg by mouth daily    fiber modular (NUTRISOURCE FIBER) packet 1 packet daily with food    glucosamine-chondroitin 500-400 MG CAPS Take 1 capsule by mouth daily   Pregabalin (LYRICA PO) Take 200 mg by mouth 2 times daily as needed        latanoprost  1 drop Both Eyes At Bedtime     mesalamine  1,000 mg Oral At Bedtime     mesalamine  1,500 mg Oral QAM     sodium chloride (PF)  3 mL Intracatheter Q8H            Review of Systems:   The 10 point review of systems is negative other than noted in the HPI.          Physical Exam:   /79  Temp 98.3  F (36.8  C) (Oral)  Resp 18  Ht 1.753 m (5' 9\")  Wt 80 kg (176 lb 4.8 oz)  SpO2 98%  BMI 26.03 kg/m2  General - Well developed, well nourished male in no apparent distress  HEENT:  Head normocephalic and atraumatic, pupils equal and round, conjunctivae clear, no scleral icterus, mucous membranes dry, external ears and nose normal  Neck: Supple without thyromegaly or masses  Lymphatic: No cervical, " or supraclavicular lymphadenopathy  Lungs: Clear to auscultation bilaterally  Heart: regular rate and rhythm, no murmurs  Abdomen:   soft, rounded, minimally distended with mild tenderness noted in the right mid abdomen only.  No rebound or guarding.  no masses palpated.  Extremities: Warm without edema  Neurologic: nonfocal  Psychiatric: Mood and affect appropriate  Skin: Without lesions, rashes, or juandice         Data:     Lab Results   Component Value Date    WBC 12.1 08/12/2018     Lab Results   Component Value Date    HGB 16.0 08/12/2018     Lab Results   Component Value Date     08/12/2018     Last Basic Metabolic Panel:  Lab Results   Component Value Date     08/12/2018      Lab Results   Component Value Date    POTASSIUM 4.3 08/12/2018     Lab Results   Component Value Date    CHLORIDE 106 08/12/2018     Lab Results   Component Value Date    RISSA 9.0 08/12/2018     Lab Results   Component Value Date    CO2 25 08/12/2018     Lab Results   Component Value Date    BUN 14 08/12/2018     Lab Results   Component Value Date    CR 1.01 08/12/2018     Lab Results   Component Value Date     08/12/2018         Imaging:  All imaging studies reviewed by me.    Results for orders placed or performed during the hospital encounter of 08/11/18   CT Abdomen Pelvis w Contrast    Narrative    CT ABDOMEN AND PELVIS WITH CONTRAST   8/11/2018 8:45 PM     HISTORY: Vomiting, history of SBO, colitis.     TECHNIQUE:  CT abdomen and pelvis with 83 mL Isovue-370 IV. Radiation  dose for this scan was reduced using automated exposure control,  adjustment of the mA and/or kV according to patient size, or iterative  reconstruction technique.    COMPARISON: CT abdomen and pelvis 10/4/2015.    FINDINGS: Multiple proximal to mid small bowel loops are dilated.  Fecalization of a small bowel loop within the right abdomen noted  along with transition to decompression series 2 image 49, coronal  series 3 image 48. Decompressed  colon. There is no abscess or free air  identified. Normal appendix.    Liver, gallbladder, adrenals, spleen, pancreas, and kidneys do not  show any acute abnormalities.      Impression    IMPRESSION:  1. Multiple dilated small bowel loops suggesting bowel obstruction.  Transition point is suggested at the right abdomen.  2. No free air or free fluid. No abscess.    REG BUSTILLO MD   XR Abdomen 2 Views    Narrative    XR ABDOMEN 2 VW 8/12/2018 6:26 AM    HISTORY: Small bowel obstruction.    COMPARISON: October 5, 2015.      Impression    IMPRESSION: Mildly dilated loop of small bowel is seen overlying the  left abdomen, which could reflect ongoing obstruction. There is stool  within the colon. No pneumoperitoneum.    ISABEL CASAS MD       This note was created using voice recognition software. Undetected word substitutions or other errors may have occurred.     Salma Saravia MD    Time spent with the patient, reviewing the EMR, reviewing laboratory and imaging studies, more than 50% of which was counseling and coordinating care:  30 minutes.

## 2018-08-12 NOTE — PROGRESS NOTES
"[facility]    Hospitalist Progress Note  Name: Shaan Espinoza    MRN: [id number]  Provider:  Trenton Ramirez DO, MPH  Date of Service: 08/12/2018    Summary of Stay: Shaan Espinoza is a 82 year old male with a past medical history significant for ulcerative colitis who presented with nausea, vomiting, diarrhea and abdominal pain. His UC is well controlled on the current PTA mesalamine. He had a colonoscopy about 10 days ago which was unremarkable along with biopsies which were negative for active disease. He typically gets \"ileus\" type symptoms about once a year with vomiting, abdominal pain which resolve on their own. However, yesterday his symptoms were more severe prompting this admission. CT showed multiple dilated small bowel loops suggesting bowel obstruction with transition point suggested at the right abdomen.  He was admitted for general surgery consultation and management of his bowel obstruction.     #  Small bowel obstruction.  Was doing reasonably well yesterday and overnight but more nauseated with worsening abdominal pain today.  General surgery has been consulted and is recommending NG tube with otherwise conservative measures for now but has not ruled out eventual need for surgery.  -- Conservative treatment at this time, with bowel rest, IV fluids, pain control, anti-emetics as needed  -- Placement of NG tube at the bedside this morning  -- Appreciate general surgery consultation  -- Obtain stool studies for enteric pathogens and C.Diff if recurrence of diarrhea      #  Ulcerative colitis. Well controlled on mesalamine. Resume with sip of water. Follows with [facility].      #  Neuropathy. Resume Lyrica.    DVT Prophylaxis: Pneumatic Compression Devices  Code Status: Full Code  Disposition: Expected discharge in 2-3 days to home. Goals prior to discharge include monitor bowel function.   Incidental Findings: None.  Family updated today: Yes      Interval History   Assumed care from previous " hospitalist. The history was fully reviewed.  Still nauseated with abdominal pain today.  Had one episode of diarrhea yesterday.  No chest pain or shortness of breath.  No fevers.  No other complaints at this time.    -Data reviewed today: I personally reviewed all new labs and imaging results over the last 24 hours.     Physical Exam   Temp: 98  F (36.7  C) Temp src: Oral BP: 143/85   Heart Rate: 95 Resp: 18 SpO2: 92 % O2 Device: None (Room air)    Vitals:    08/11/18 1930 08/11/18 2227   Weight: 77.1 kg (170 lb) 80 kg (176 lb 4.8 oz)     Vital Signs with Ranges  Temp:  [97.9  F (36.6  C)-99  F (37.2  C)] 98  F (36.7  C)  Heart Rate:  [79-96] 95  Resp:  [16-18] 18  BP: (130-176)/() 143/85  SpO2:  [92 %-100 %] 92 %  I/O last 3 completed shifts:  In: 738 [I.V.:738]  Out: -     GENERAL: No apparent distress. Awake, alert, and fully oriented.  HEENT: Normocephalic, atraumatic. Extraocular movements intact.  CARDIOVASCULAR: Regular rate and rhythm without murmurs or rubs. No S3.  PULMONARY: Clear bilaterally.  GASTROINTESTINAL: Soft, diffusely tender and distended. Bowel sounds normoactive.   EXTREMITIES: No cyanosis or clubbing. No edema.  NEUROLOGICAL: CN 2-12 grossly intact, no focal neurological deficits.  DERMATOLOGICAL: No rash, ulcer, bruising, nor jaundice.     Medications     lactated ringers 125 mL/hr at 08/12/18 1009       latanoprost  1 drop Both Eyes At Bedtime     mesalamine  1,000 mg Oral At Bedtime     mesalamine  1,500 mg Oral QAM     sodium chloride (PF)  3 mL Intracatheter Q8H     Data     Laboratory:    Recent Labs  Lab 08/12/18 0644 08/11/18 1938   WBC 12.1* 12.5*   HGB 16.0 16.9   HCT 46.4 47.8   MCV 96 93    223       Recent Labs  Lab 08/12/18 0644 08/11/18 1938    133   POTASSIUM 4.3 3.6   CHLORIDE 106 98   CO2 25 25   ANIONGAP 8 10   * 169*   BUN 14 20   CR 1.01 0.94   GFRESTIMATED 71 77   GFRESTBLACK 86 >90   RISSA 9.0 9.6     No results for input(s): CULT in the  last 168 hours.    Imaging:  Recent Results (from the past 24 hour(s))   CT Abdomen Pelvis w Contrast    Narrative    CT ABDOMEN AND PELVIS WITH CONTRAST   8/11/2018 8:45 PM     HISTORY: Vomiting, history of SBO, colitis.     TECHNIQUE:  CT abdomen and pelvis with 83 mL Isovue-370 IV. Radiation  dose for this scan was reduced using automated exposure control,  adjustment of the mA and/or kV according to patient size, or iterative  reconstruction technique.    COMPARISON: CT abdomen and pelvis 10/4/2015.    FINDINGS: Multiple proximal to mid small bowel loops are dilated.  Fecalization of a small bowel loop within the right abdomen noted  along with transition to decompression series 2 image 49, coronal  series 3 image 48. Decompressed colon. There is no abscess or free air  identified. Normal appendix.    Liver, gallbladder, adrenals, spleen, pancreas, and kidneys do not  show any acute abnormalities.      Impression    IMPRESSION:  1. Multiple dilated small bowel loops suggesting bowel obstruction.  Transition point is suggested at the right abdomen.  2. No free air or free fluid. No abscess.    REG BUSTILLO MD   XR Abdomen 2 Views    Narrative    XR ABDOMEN 2 VW 8/12/2018 6:26 AM    HISTORY: Small bowel obstruction.    COMPARISON: October 5, 2015.      Impression    IMPRESSION: Mildly dilated loop of small bowel is seen overlying the  left abdomen, which could reflect ongoing obstruction. There is stool  within the colon. No pneumoperitoneum.    MD Trenton WINTERS DO MPH  [facility]  [address] [place] [address]  Pager: [phone]  08/12/2018

## 2018-08-12 NOTE — ED PROVIDER NOTES
History     Chief Complaint:  Nausea, Vomiting, & Diarrhea      HPI   Shaan Espinoza is a 82 year old male with a history of SBO secondary to ileus and UC who presents with nausea, vomiting, and diarrhea. Per the patient's spouse, the patient had ileus symptoms start in 2009 with subsequent occurrence every two years that progressed to one month frequency but last episode was 8 months ago and has followed up with his GI specialist for this. He also had a unremarkable colonoscopy performed 10 days ago to look for bowels. The patient reports that at 1200 hours today he suddenly developed episodic emesis and abdominal pain and then by 1630 hours started to pass diarrhea, prompting his presentation here by spouse. He describes his non radiating, severe abdominal pain as located diffusely across the lower abdomen. He mentioned that in the past when he stopped mesalamine he developed similar abdominal pain. He describes his vomiting as watery, brown fluid, or nothing produced. He denies bloody stools and hematemesis. Of note, he states that he has not eaten anything since breakfast.         Allergies:  No known drug allergies    Medications:    Mesalamine  calcium carbonate  glucosamine-chondroitin   fiber modular  multivitamin, therapeutic with minerals  Pregabalin    Past Medical History:    UC  Ileus  Neuropathy  SBO    Past Surgical History:    Colonoscopy    Family History:    History reviewed. No pertinent family history.     Social History:  Marital Status:     Tobacco Status: Former Smoker, Quit in 1962  Alcohol Use: Yes  Presents With: Spouse        Review of Systems   Gastrointestinal: Positive for abdominal pain, diarrhea, nausea and vomiting. Negative for blood in stool.        Negative: Hematemesis   All other systems reviewed and are negative.    Physical Exam     Patient Vitals for the past 24 hrs:   BP Temp Temp src Heart Rate Resp SpO2 Height Weight   08/11/18 2145 - - - - - 96 % - -  "  08/11/18 2115 166/84 - - - - 99 % - -   08/11/18 2100 (!) 176/98 - - - - - - -   08/11/18 2030 142/88 - - - - 97 % - -   08/11/18 2015 152/86 - - - - 100 % - -   08/11/18 2000 (!) 153/93 - - - - 96 % - -   08/11/18 1945 (!) 166/94 - - - - 100 % - -   08/11/18 1930 (!) 174/100 97.9  F (36.6  C) Oral 83 16 98 % 1.753 m (5' 9\") 77.1 kg (170 lb)       Physical Exam  Constitutional:  Appears well-developed and well-nourished. Alert. Conversant, but uncomfortable.  HENT:   Head: Atraumatic.   Nose: Nose normal.  Mouth/Throat: Oral mucosa is clear and moist. no trismus. Pharynx normal. Tonsils symmetric. No tonsillar enlargement, erythema, or exudate.  Eyes: Conjunctivae normal. EOM normal. Pupils equal, round, and reactive to light. No scleral icterus.   Neck: Normal range of motion. Neck supple. No tracheal deviation present.   Cardiovascular: Normal rate, regular rhythm. No gallop. No friction rub. No murmur heard. Symmetric radial artery pulses   Pulmonary/Chest: Effort normal. No stridor. No respiratory distress. No wheezes. No rales. No rhonchi . No tenderness.   Abdominal: Soft. Bowel sounds diminished, almost absent. No distension or tympany. No mass. Diffuse tenderness. No rebound. No guarding.   Musculoskeletal:   RUE: Normal range of motion. No tenderness. No deformity  LUE: Normal range of motion. No tenderness. No deformity  RLE: Normal range of motion. No edema. No tenderness. No deformity  LLE: Normal range of motion. No edema. No tenderness. No deformity  Lymph: No cervical adenopathy.   Neurological: Alert and oriented to person, place, and time. Normal strength. CN II-VII intact. No sensory deficit. GCS eye subscore is 4. GCS verbal subscore is 5. GCS motor subscore is 6. Normal coordination   Skin: Skin is warm and dry. No rash noted. No pallor. Normal capillary refill.  Psychiatric:  Normal mood. Normal affect.     Emergency Department Course     Imaging:  Radiographic findings were communicated with " the patient and Admitting MD who voiced understanding of the findings.  CT-scan Abdomen/Pelvis w/ Contrast:  IMPRESSION:  1. Multiple dilated small bowel loops suggesting bowel obstruction.  Transition point is suggested at the right abdomen.  2. No free air or free fluid. No abscess.  Preliminary result per radiology.     Laboratory:  ISTAT Gases Lactate Wali POCT: pCO2 35 (L) o/w WNL (Lactic Acid 1.6)  UA with Microscopic: Ketone 20 (A), pH 8.0 (H), Mucous Present (A) o/w Unremarkable.  CBC: WBC 12.5 (H), Absolute Neutrophil 11.4 (H), Absolute Lymphocytes 0.6 (L) o/w WNL (HGB 16.9, )  CMP: Glucose 169 (H) o/w WNL (Creatinine 0.94)  Lipase:180  Lactic Acid Whole Blood: 2.8 (H)    Interventions:  1940: 0.9% Sodium Chloride Bolus 1000 ml IV   1943: Zofran 4mg IV injection   1944: Dilaudid Injection 0.5 mg IV  2027: Lactated Ringers Bolus 1000 ml IV   2106: Zofran 4mg IV injection        Emergency Department Course:  Past medical records, nursing notes, and vitals reviewed.  1933: I performed an exam of the patient and obtained history, as documented above.   IV inserted, urine collected, and blood drawn. The patient was placed on continuous cardiac monitoring and pulse oximetry.  The patient was sent for a Abdomen/Pelvis CT-Scan (with Isovue-370 contrast) while in the emergency department, findings above.  2109: I discussed the case with Dr. High of Hospitalist Service regarding the patient.  Findings and plan explained to the Patient and spouse who consents to admission.   2113: Discussed the patient with Dr. High of Hospitalist Service, who will admit the patient to a medical bed for further monitoring, evaluation, and treatment.     Impression & Plan      Medical Decision Making:  Shaan Espinoza is a 82 year old male with a history of ulcerative colitis as well as a history of episodic events that include vomiting and diffuse abdominal pain.  In the past these events have been thought to be due to  ileus.  He had a recurrent event today that is lasting longer than in the past.  Differential here included ileus, flare of UC, infectious gastroenteritis, bowel obstruction, diverticulitis, ischemic colitis, among others.  Laboratory workup is reassuring.  CT imaging suggests dilated loops of bowel with a transition point indicative of small bowel obstruction this time.  This is different than episodes in the past where he is apparently had a ileus.  Initial lactic acid was elevated.  It normalized after IV fluids suggesting was likely elevated due to dehydration and not due to bowel ischemia.  No indication for emergent surgical intervention identified at this time.  Pain is much improved after IV Dilaudid.  Nausea is improved but requiring serial doses of Zofran.  At this point no indication for NG tube, and patient would strongly prefer not to have that.  Therefore will admit for IV hydration, symptom control, careful monitoring.    Critical Care time:  none    Diagnosis:    ICD-10-CM    1. SBO (small bowel obstruction) K56.609 UA with Microscopic       Disposition:  Admitted to Dr. High of Hospitalist Service    Brad Mitchell  8/11/2018   [facility] EMERGENCY DEPARTMENT  I, Brad Mitchell, am serving as a scribe at 7:33 PM on 8/11/2018 to document services personally performed by Merrill Hua MD based on my observations and the provider's statements to me.         Merrill Hua MD  08/12/18 [id number]

## 2018-08-12 NOTE — ED NOTES
[facility]  ED Nurse Handoff Report    Shaan Espinoza is a 82 year old male   ED Chief complaint: Nausea, Vomiting, & Diarrhea  . ED Diagnosis:   Final diagnoses:   SBO (small bowel obstruction)     Allergies: No Known Allergies    Code Status: Full Code  Activity level - Baseline/Home:  Independent. Activity Level - Current:   Stand with Assist. Lift room needed: No. Bariatric: No   Needed: No   Isolation: No. Infection: Not Applicable.     Vital Signs:   Vitals:    08/11/18 1945 08/11/18 2000 08/11/18 2015 08/11/18 2030   BP: (!) 166/94 (!) 153/93 152/86 142/88   Resp:       Temp:       TempSrc:       SpO2: 100% 96% 100% 97%   Weight:       Height:           Cardiac Rhythm:  ,      Pain level:    Patient confused: No. Patient Falls Risk: Yes.   Elimination Status: Has voided   Patient Report - Initial Complaint: Abdominal pain. Focused Assessment: Hx illus, patient has abd pain, diarrhea, nausea and vomiting today since 1200. CT shows bowel obstruction  Tests Performed:   CT Abdomen Pelvis w Contrast   Final Result   IMPRESSION:   1. Multiple dilated small bowel loops suggesting bowel obstruction.   Transition point is suggested at the right abdomen.   2. No free air or free fluid. No abscess.      REG BUSTILLO MD      . Abnormal Results:   Labs Ordered and Resulted from Time of ED Arrival Up to the Time of Departure from the ED   CBC WITH PLATELETS DIFFERENTIAL - Abnormal; Notable for the following:        Result Value    WBC 12.5 (*)     Absolute Neutrophil 11.4 (*)     Absolute Lymphocytes 0.6 (*)     All other components within normal limits   COMPREHENSIVE METABOLIC PANEL - Abnormal; Notable for the following:     Glucose 169 (*)     All other components within normal limits   LACTIC ACID WHOLE BLOOD - Abnormal; Notable for the following:     Lactic Acid 2.8 (*)     All other components within normal limits   ROUTINE UA WITH MICROSCOPIC - Abnormal; Notable for the following:      Ketones Urine 20 (*)     pH Urine 8.0 (*)     Mucous Urine Present (*)     Amorphous Crystals Few (*)     All other components within normal limits   LIPASE   PERIPHERAL IV CATHETER   FREE WATER   ISTAT CG4 GASES LACTATE GISSELL NURSING POCT   .   Treatments provided: IV, pain meds, fluids, nausea meds  Family Comments: At the bedside  OBS brochure/video discussed/provided to patient:  Yes  ED Medications:   Medications   0.9% sodium chloride BOLUS (0 mLs Intravenous Stopped 8/11/18 2027)     Followed by   sodium chloride 0.9% infusion (not administered)   ondansetron (ZOFRAN) injection 4 mg (not administered)   HYDROmorphone (PF) (DILAUDID) injection 0.5 mg (0.5 mg Intravenous Given 8/11/18 1944)   ondansetron (ZOFRAN) injection 4 mg (4 mg Intravenous Given 8/11/18 2106)   lactated ringers BOLUS 1,000 mL (1,000 mLs Intravenous New Bag 8/11/18 2027)   0.9% sodium chloride BOLUS (0 mLs Intravenous Stopped 8/11/18 2045)   iopamidol (ISOVUE-370) solution 500 mL (83 mLs Intravenous Given 8/11/18 2041)     Drips infusing:  No  For the majority of the shift, the patient's behavior Green. Interventions performed were N/A.     Severe Sepsis OR Septic Shock Diagnosis Present: No      ED Nurse Name/Phone Number: Kaylee CARDOSOMargie Ellsworth,   9:48 PM    RECEIVING UNIT ED HANDOFF REVIEW    Above ED Nurse Handoff Report was reviewed: Yes  Reviewed by: Antoinette Almonte on August 11, 2018 at 9:56 PM

## 2018-08-12 NOTE — H&P
"[facility]    History and Physical  Hospitalist    Name: Shaan Espinoza    MRN: [id number]  YOB: 1935    Age: 82 year old  Primary care provider: Eli Juárez       Date of Admission:  8/11/2018  Date of Service (when I saw the patient): 08/11/18    Assessment & Plan   Shaan Espinoza is a 82 year old male with a past medical history significant for ulcerative colitis who presents with nausea, vomiting, diarrhea and abdominal pain. His UC is well controlled on the current medications. He had a colonoscopy about 10 days ago which was unremarkable along with biopsies which were negative for active disease. He typically gets \"ileus\" type symptoms about once a year with vomiting, abdominal pain which resolve on their own. However, today's episode was more severe prompting this admission. CT showed \"1. Multiple dilated small bowel loops suggesting bowel obstruction. Transition point is suggested at the right abdomen. 2. No free air or free fluid. No abscess.\"    #  Small bowel obstruction. At this time, he feels comfortable without any active nausea or vomiting. He has a mildly elevated lactic acid but imaging does not show any signs of bowel ischemia and exam is benign. He never had any bowel surgery in the past, and he had an MR enterography last year which did not show any signs of small bowel involvement. So the etiology of these episodes is somewhat unclear. Could also be infectious enteritis causing ileus type symptoms.   -- Conservative treatment at this time, with bowel rest, IV fluids, pain control, anti-emetics as needed  -- Hold on NG tube at this time, no longer vomiting and patient mentions he had a very hard time with NG tube last time due to gag reflex  -- Repeat lactic acid in ER pending   -- General surgery consultation will need to considered if he does not improve as anticipated or has worsening symptoms   -- FUP X-Ray in morning   -- Obtain stool studies for enteric " pathogens and C.Diff if recurrence of diarrhea     #  Ulcerative colitis. Well controlled on mesalamine. Resume with sip of water. Follows with [facility]     #  Neuropathy. Resume Lyrica once dose confirmed      DVT Prophylaxis: Pneumatic Compression Devices  Code Status: Full Code    Disposition: Expected discharge in 2 days once SBO resolved and taking PO.    Plan of care was discussed with the patient and his wife at bedside in great detail. All of the questions were answered extensively. Patient is comfortable with the plan and agrees with it.     Kristin High    Chief Complaint   Abdominal pain    History is obtained from the patient    Case discussed with ER provider Dr. Hua    History of Present Illness   Shaan Espinoza is a 82 year old male who presents with abdominal pain. He woke up this morning feeling fine, had a cereal and toast with a normal BM. Around noon time he started to develop vomiting and mid abdominal pain. He also had some diarrhea like watery BMs. Last BM was around 5 pm. He reports that the pain is better now after receiving pain medications although still has mild nausea. No fevers, chills, chest pain or SOB. Reports no sick contacts or unusual food exposure.     Mentions that since 2009 he has been having episodes of ileus with vomiting and abdominal pain which resolve on their own quickly. He had a colonoscopy few days ago by Dr. Whaley from [facility] which was reportedly normal. No other complaints voiced.     Past Medical History    I have reviewed this patient's medical history and updated it with pertinent information if needed.   Past Medical History:   Diagnosis Date     Colitis      Ileus (H)      Neuropathy        Past Surgical History   I have reviewed this patient's surgical history and updated it with pertinent information if needed.  Past Surgical History:   Procedure Laterality Date     COLONOSCOPY N/A 8/1/2018    Procedure: COMBINED COLONOSCOPY, SINGLE OR MULTIPLE  BIOPSY/POLYPECTOMY BY BIOPSY;  Colonoscopy cold forceps biopsies;  Surgeon: Carlos Whaley MD;  Location:  GI       Prior to Admission Medications   Prior to Admission Medications   Prescriptions Last Dose Informant Patient Reported? Taking?   Pregabalin (LYRICA PO)  Self Yes No   Sig: Take 200 mg by mouth 2 times daily as needed    calcium carbonate (OS-RSISA 500 MG [place]) 500 MG tablet 2018 at Unknown time  Yes Yes   Sig: Take 500 mg by mouth daily    fiber modular (NUTRISOURCE FIBER) packet 2018 at Unknown time  Yes Yes   Si packet daily with food    glucosamine-chondroitin 500-400 MG CAPS 2018 at Unknown time  Yes Yes   Sig: Take 1 capsule by mouth daily   latanoprost (XALATAN) 0.005 % ophthalmic solution 8/10/2018 at Unknown time  Yes Yes   Sig: Place 1 drop into both eyes At Bedtime   mesalamine (PENTASA) 500 MG CPCR CR capsule 8/10/2018 at Unknown time  Yes Yes   Sig: Take 1,000 mg by mouth At Bedtime   mesalamine (PENTASA) 500 MG CPCR CR capsule 2018 at Unknown time  Yes Yes   Sig: Take 1,500 mg by mouth every morning      Facility-Administered Medications: None     Allergies   No Known Allergies    Social History   I have reviewed this patient's social history and updated it with pertinent information if needed.   Social History     Social History     Marital status:      Spouse name: N/A     Number of children: N/A     Years of education: N/A     Occupational History     Not on file.     Social History Main Topics     Smoking status: Former Smoker     Quit date: 10/4/1962     Smokeless tobacco: Never Used     Alcohol use Yes     Drug use: Not on file     Sexual activity: Not on file     Other Topics Concern     Not on file     Social History Narrative         Family History   Reviewed and non-contributory to the admission today     Review of Systems   The 10 point Review of Systems is negative other than noted in the HPI or here.     Physical Exam   Temp: 97.9  F  (36.6  C) Temp src: Oral BP: 142/88   Heart Rate: 83 Resp: 16 SpO2: 97 % O2 Device: None (Room air)    Vital Signs with Ranges  Temp:  [97.9  F (36.6  C)] 97.9  F (36.6  C)  Heart Rate:  [83] 83  Resp:  [16] 16  BP: (142-174)/() 142/88  SpO2:  [96 %-100 %] 97 %  170 lbs 0 oz    GENERAL:  Awake and alert, Comfortable appearing, No acute distress.  PSYCH: Pleasant, Cooperative, normal affect, no hallucinations   EYES: EOMI, conjunctiva clear  HEENT:  Head is normocephalic, atraumatic, Neck is Supple, trachea is midline   CARDIOVASCULAR: Regular rate and rhythm, Normal S1, S2, no loud murmurs, no rubs or gallops.   PULMONARY:  Clear to auscultation bilaterally with good entry on both sides  CHEST: Good inspiratory effort bilaterally   GI: Abdomen is soft, mildly distended, no major TTP, may be very mild above umbilicus, bowel sounds are present. No rebound or guarding   SKIN:  Dry, warm to touch. No obvious exanthems on exposed areas  EXTREMITIES:  Good capillary refill with signs of adequate peripheral perfusion. No peripheral edema   Neuro: Awake and oriented x 3. No focal weakness or numbness is noted. Moving all extremities with good strength, Grossly non-focal.   MSK: Good range of motion in all major joints of upper and lower extremity, No acute joint synovitis of the major joints is noted.     Data   Data reviewed today:  I personally reviewed .    All lab data and imaging results from today have been reviewed.       Recent Labs  Lab 08/11/18  1938   WBC 12.5*   HGB 16.9   MCV 93         POTASSIUM 3.6   CHLORIDE 98   CO2 25   BUN 20   CR 0.94   ANIONGAP 10   RISSA 9.6   *   ALBUMIN 4.5   PROTTOTAL 8.5   BILITOTAL 1.3   ALKPHOS 99   ALT 24   AST 28   LIPASE 180       Recent Results (from the past 24 hour(s))   CT Abdomen Pelvis w Contrast    Narrative    CT ABDOMEN AND PELVIS WITH CONTRAST   8/11/2018 8:45 PM     HISTORY: Vomiting, history of SBO, colitis.     TECHNIQUE:  CT abdomen and  pelvis with 83 mL Isovue-370 IV. Radiation  dose for this scan was reduced using automated exposure control,  adjustment of the mA and/or kV according to patient size, or iterative  reconstruction technique.    COMPARISON: CT abdomen and pelvis 10/4/2015.    FINDINGS: Multiple proximal to mid small bowel loops are dilated.  Fecalization of a small bowel loop within the right abdomen noted  along with transition to decompression series 2 image 49, coronal  series 3 image 48. Decompressed colon. There is no abscess or free air  identified. Normal appendix.    Liver, gallbladder, adrenals, spleen, pancreas, and kidneys do not  show any acute abnormalities.      Impression    IMPRESSION:  1. Multiple dilated small bowel loops suggesting bowel obstruction.  Transition point is suggested at the right abdomen.  2. No free air or free fluid. No abscess.

## 2018-08-13 LAB
ANION GAP SERPL CALCULATED.3IONS-SCNC: 5 MMOL/L (ref 3–14)
BUN SERPL-MCNC: 10 MG/DL (ref 7–30)
CALCIUM SERPL-MCNC: 8.4 MG/DL (ref 8.5–10.1)
CHLORIDE SERPL-SCNC: 108 MMOL/L (ref 94–109)
CO2 SERPL-SCNC: 27 MMOL/L (ref 20–32)
CREAT SERPL-MCNC: 0.86 MG/DL (ref 0.66–1.25)
ERYTHROCYTE [DISTWIDTH] IN BLOOD BY AUTOMATED COUNT: 12.4 % (ref 10–15)
GFR SERPL CREATININE-BSD FRML MDRD: 86 ML/MIN/1.7M2
GLUCOSE SERPL-MCNC: 91 MG/DL (ref 70–99)
HCT VFR BLD AUTO: 40 % (ref 40–53)
HGB BLD-MCNC: 13.8 G/DL (ref 13.3–17.7)
MCH RBC QN AUTO: 33.1 PG (ref 26.5–33)
MCHC RBC AUTO-ENTMCNC: 34.5 G/DL (ref 31.5–36.5)
MCV RBC AUTO: 96 FL (ref 78–100)
PLATELET # BLD AUTO: 190 10E9/L (ref 150–450)
POTASSIUM SERPL-SCNC: 3.9 MMOL/L (ref 3.4–5.3)
RBC # BLD AUTO: 4.17 10E12/L (ref 4.4–5.9)
SODIUM SERPL-SCNC: 140 MMOL/L (ref 133–144)
WBC # BLD AUTO: 9 10E9/L (ref 4–11)

## 2018-08-13 PROCEDURE — 25000132 ZZH RX MED GY IP 250 OP 250 PS 637: Mod: GY | Performed by: HOSPITALIST

## 2018-08-13 PROCEDURE — 80048 BASIC METABOLIC PNL TOTAL CA: CPT | Performed by: HOSPITALIST

## 2018-08-13 PROCEDURE — 99231 SBSQ HOSP IP/OBS SF/LOW 25: CPT | Performed by: SURGERY

## 2018-08-13 PROCEDURE — A9270 NON-COVERED ITEM OR SERVICE: HCPCS | Mod: GY | Performed by: HOSPITALIST

## 2018-08-13 PROCEDURE — 36415 COLL VENOUS BLD VENIPUNCTURE: CPT | Performed by: HOSPITALIST

## 2018-08-13 PROCEDURE — 12000000 ZZH R&B MED SURG/OB

## 2018-08-13 PROCEDURE — 85027 COMPLETE CBC AUTOMATED: CPT | Performed by: HOSPITALIST

## 2018-08-13 PROCEDURE — 25000132 ZZH RX MED GY IP 250 OP 250 PS 637: Mod: GY | Performed by: INTERNAL MEDICINE

## 2018-08-13 PROCEDURE — 99232 SBSQ HOSP IP/OBS MODERATE 35: CPT | Performed by: INTERNAL MEDICINE

## 2018-08-13 PROCEDURE — 25000128 H RX IP 250 OP 636: Performed by: INTERNAL MEDICINE

## 2018-08-13 RX ADMIN — MESALAMINE 1000 MG: 250 CAPSULE ORAL at 21:34

## 2018-08-13 RX ADMIN — SODIUM CHLORIDE, POTASSIUM CHLORIDE, SODIUM LACTATE AND CALCIUM CHLORIDE: 600; 310; 30; 20 INJECTION, SOLUTION INTRAVENOUS at 05:12

## 2018-08-13 RX ADMIN — SODIUM CHLORIDE, POTASSIUM CHLORIDE, SODIUM LACTATE AND CALCIUM CHLORIDE: 600; 310; 30; 20 INJECTION, SOLUTION INTRAVENOUS at 21:07

## 2018-08-13 RX ADMIN — LATANOPROST 1 DROP: 50 SOLUTION/ DROPS OPHTHALMIC at 21:34

## 2018-08-13 RX ADMIN — PHENOL 1 ML: 1.5 LIQUID ORAL at 13:05

## 2018-08-13 RX ADMIN — PHENOL 1 ML: 1.5 LIQUID ORAL at 17:14

## 2018-08-13 RX ADMIN — MESALAMINE 1500 MG: 250 CAPSULE ORAL at 08:49

## 2018-08-13 RX ADMIN — PHENOL 1 ML: 1.5 LIQUID ORAL at 19:31

## 2018-08-13 RX ADMIN — PHENOL 1 ML: 1.5 LIQUID ORAL at 21:34

## 2018-08-13 RX ADMIN — PHENOL 1 ML: 1.5 LIQUID ORAL at 15:34

## 2018-08-13 RX ADMIN — PHENOL 1 ML: 1.5 LIQUID ORAL at 14:40

## 2018-08-13 RX ADMIN — SODIUM CHLORIDE, POTASSIUM CHLORIDE, SODIUM LACTATE AND CALCIUM CHLORIDE: 600; 310; 30; 20 INJECTION, SOLUTION INTRAVENOUS at 13:05

## 2018-08-13 RX ADMIN — PHENOL 1 ML: 1.5 LIQUID ORAL at 11:15

## 2018-08-13 ASSESSMENT — ACTIVITIES OF DAILY LIVING (ADL)
ADLS_ACUITY_SCORE: 9

## 2018-08-13 NOTE — PROGRESS NOTES
"[facility]    Hospitalist Progress Note      Assessment & Plan   Shaan Espinoza is an 82 year old male with history of ulcerative colitis who presented to the ED on 8/11/18 with nausea, vomiting, diarrhea and abdominal pain. His UC has been well controlled on mesalamine. He had a colonoscopy about 10 days prior to admissoin which was unremarkable along with biopsies which were negative for active disease. He typically gets \"ileus\" type symptoms about once a year with vomiting, abdominal pain which resolve on their own. However, prior to presentation his symptoms were more severe prompting this presentation. CT showed multiple dilated small bowel loops suggesting bowel obstruction with transition point suggested at the right abdomen.  He was admitted for further treatment of bowel obstruction.    Problem list:      1.  Small bowel obstruction.  Possibly improving with a small amount of flatus, but still few bowel sounds. Surgery following- continue NPO, NG, IVF, analgesics, and antiemetics.  No surgery planned at this time.       2.  Ulcerative colitis. Well controlled on mesalamine (continue). Follows with [facility].       3.  Neuropathy. Continue prior to admission Lyrica.    # Pain Assessment:  Current Pain Score 8/13/2018   Patient currently in pain? yes   Pain score (0-10) 6   Pain location Throat   Pain descriptors -   - Shaan is experiencing pain due to SBO. Pain management was discussed and the plan was created in a collaborative fashion.  Shaan's response to the current recommendations: compliant  - Prn Tylenol, oxycodone, and IV Dilaudid.         DVT Prophylaxis: Pneumatic Compression Devices  Code Status: Full Code    Disposition: Expected discharge in 1-2 days once SBO resolves- longer if surgery is needed.    Kannan Leo    Interval History   Passing a small amount of flatus.  Pain is better.  No new problems.     -Data reviewed today: I reviewed all new labs and imaging results over " the last 24 hours. I personally reviewed no images or EKG's today.    Physical Exam   Temp: 97.9  F (36.6  C) Temp src: Oral BP: 143/78   Heart Rate: 74 Resp: 12 SpO2: 98 % O2 Device: None (Room air)    Vitals:    08/11/18 1930 08/11/18 2227   Weight: 77.1 kg (170 lb) 80 kg (176 lb 4.8 oz)     Vital Signs with Ranges  Temp:  [96.3  F (35.7  C)-99  F (37.2  C)] 97.9  F (36.6  C)  Heart Rate:  [74-90] 74  Resp:  [12-20] 12  BP: (124-186)/(73-91) 143/78  SpO2:  [95 %-98 %] 98 %  I/O last 3 completed shifts:  In: 2901 [I.V.:2901]  Out: 350 [Emesis/NG output:350]    GENERAL:  Comfortable except for NG tube. Cooperative.  PSYCH: pleasant, oriented, No acute distress.  EYES: PERRLA, Normal conjunctiva.  HEART:  Regular rate and rhythm. No JVD. Pulses normal. No edema.  LUNGS:  Clear to auscultation, normal Respiratory effort.  ABDOMEN:  Soft, no hepatosplenomegaly, some distension and very few bowel sounds.  EXTREMETIES: No clubbing, cyanosis or ischemia  SKIN:  Dry to touch, No rash.       Medications     lactated ringers 125 mL/hr at 08/13/18 1305       latanoprost  1 drop Both Eyes At Bedtime     mesalamine  1,000 mg Oral At Bedtime     mesalamine  1,500 mg Oral QAM     sodium chloride (PF)  3 mL Intracatheter Q8H       Data     Recent Labs  Lab 08/13/18  0717 08/12/18  0644 08/11/18  1938   WBC 9.0 12.1* 12.5*   HGB 13.8 16.0 16.9   MCV 96 96 93    227 223    139 133   POTASSIUM 3.9 4.3 3.6   CHLORIDE 108 106 98   CO2 27 25 25   BUN 10 14 20   CR 0.86 1.01 0.94   ANIONGAP 5 8 10   RISSA 8.4* 9.0 9.6   GLC 91 107* 169*   ALBUMIN  --   --  4.5   PROTTOTAL  --   --  8.5   BILITOTAL  --   --  1.3   ALKPHOS  --   --  99   ALT  --   --  24   AST  --   --  28   LIPASE  --   --  180       No results found for this or any previous visit (from the past 24 hour(s)).

## 2018-08-13 NOTE — PROGRESS NOTES
"[facility]   General Surgery Progress Note          Assessment and Plan:   Assessment:   Recurrent small bowel obstruction  Past medical history of ulceritive colitis, ileus and neuropathy      Plan:   -Continue NPO/NGT  -Increase activity as tolerated  -Pain mgmt: dilaudid, tylenol  -Await return of bowel function         Interval History:   Resting in bed. Doing better after NGT placed. Now having some discomfort from NGT. Up walking halls. No flatus yet. Voiding independently. +NPO.         Physical Exam:   Blood pressure 124/75, temperature 96.3  F (35.7  C), temperature source Oral, resp. rate 20, height 1.753 m (5' 9\"), weight 80 kg (176 lb 4.8 oz), SpO2 95 %.    I/O last 3 completed shifts:  In: 2965 [I.V.:2965]  Out: 510 [Emesis/NG output:510]    Abdomen: soft, mildly distneded, NT, +good BS          Data:     Recent Labs   Lab Test  08/13/18   0717  08/12/18   0644  08/11/18   1938   HGB  13.8  16.0  16.9   WBC  9.0  12.1*  12.5*     Abd Xray 8/12/18. IMPRESSION: Mildly dilated loop of small bowel is seen overlying the  left abdomen, which could reflect ongoing obstruction. There is stool  within the colon. No pneumoperitoneum.     Artemio Fonseca PA-C    Small amt flatus and tiny amt stool.  Pain resolved.  Will keep NG tube another day.  If it falls out, ok to leave it out.    The patient has been seen and examined by me.  I agree with the above assessment and plan.  Salma Saravia MD      "

## 2018-08-13 NOTE — PLAN OF CARE
Problem: Bowel Obstruction (Adult)  Goal: Signs and Symptoms of Listed Potential Problems Will be Absent, Minimized or Managed (Bowel Obstruction)  Signs and symptoms of listed potential problems will be absent, minimized or managed by discharge/transition of care (reference Bowel Obstruction (Adult) CPG).   Outcome: No Change  Ambulatory Status:  Pt up SBA.  VS: VSS  Pain:  denies  Resp: LS CTA  GI:  Not having nausea.  NPO with ice.  BS +, faint.    Last BM 8/11/2018  :  Voiding adequately  Skin:  WDL  Tx:  NG to LIS  Disposition:  TBD     Will continue to monitor and provide supportive cares.

## 2018-08-13 NOTE — PLAN OF CARE
Problem: Patient Care Overview  Goal: Plan of Care/Patient Progress Review  Outcome: Improving  Ambulatory Status:  Pt up ind. Ambulated in halls multiple times this shift   Orientation: a/o  VS:  VSS  Pain:  denies  Resp: LS clear.   GI:  denies nausea.  NPO with ice chips. Hypo BS.  Passing flatus.  Last BM . Nml brown output.   :  Voiding without difficulty   Consults:  Surgery   Disposition:  tbd

## 2018-08-13 NOTE — PLAN OF CARE
Problem: Patient Care Overview  Goal: Plan of Care/Patient Progress Review  Ambulatory Status:  Pt up SBA  VS:  vss  Pain:  Denies pain  Resp: LS clear  GI:  denies nausea.  NPO with meds and ice.  BS hypoactive.  Last BM 8/11.  NG in place  Consults:  Surgery  Disposition:  tbd

## 2018-08-14 ENCOUNTER — APPOINTMENT (OUTPATIENT)
Dept: GENERAL RADIOLOGY | Facility: CLINIC | Age: 83
DRG: 389 | End: 2018-08-14
Attending: SURGERY
Payer: MEDICARE

## 2018-08-14 PROCEDURE — 74250 X-RAY XM SM INT 1CNTRST STD: CPT

## 2018-08-14 PROCEDURE — 99232 SBSQ HOSP IP/OBS MODERATE 35: CPT | Performed by: INTERNAL MEDICINE

## 2018-08-14 PROCEDURE — 12000000 ZZH R&B MED SURG/OB

## 2018-08-14 PROCEDURE — 25000132 ZZH RX MED GY IP 250 OP 250 PS 637: Mod: GY | Performed by: HOSPITALIST

## 2018-08-14 PROCEDURE — A9270 NON-COVERED ITEM OR SERVICE: HCPCS | Mod: GY | Performed by: HOSPITALIST

## 2018-08-14 PROCEDURE — 25000128 H RX IP 250 OP 636: Performed by: INTERNAL MEDICINE

## 2018-08-14 RX ADMIN — PHENOL 1 ML: 1.5 LIQUID ORAL at 20:51

## 2018-08-14 RX ADMIN — PHENOL 1 ML: 1.5 LIQUID ORAL at 09:17

## 2018-08-14 RX ADMIN — LATANOPROST 1 DROP: 50 SOLUTION/ DROPS OPHTHALMIC at 20:55

## 2018-08-14 RX ADMIN — MESALAMINE 1000 MG: 250 CAPSULE ORAL at 20:53

## 2018-08-14 RX ADMIN — SODIUM CHLORIDE, POTASSIUM CHLORIDE, SODIUM LACTATE AND CALCIUM CHLORIDE: 600; 310; 30; 20 INJECTION, SOLUTION INTRAVENOUS at 20:51

## 2018-08-14 RX ADMIN — PHENOL 1 ML: 1.5 LIQUID ORAL at 01:36

## 2018-08-14 RX ADMIN — PHENOL 1 ML: 1.5 LIQUID ORAL at 12:39

## 2018-08-14 RX ADMIN — DIATRIZOATE MEGLUMINE AND DIATRIZOATE SODIUM 120 ML: 660; 100 SOLUTION ORAL; RECTAL at 15:49

## 2018-08-14 RX ADMIN — MESALAMINE 1500 MG: 250 CAPSULE ORAL at 09:15

## 2018-08-14 RX ADMIN — SODIUM CHLORIDE, POTASSIUM CHLORIDE, SODIUM LACTATE AND CALCIUM CHLORIDE: 600; 310; 30; 20 INJECTION, SOLUTION INTRAVENOUS at 04:46

## 2018-08-14 RX ADMIN — PHENOL 1 ML: 1.5 LIQUID ORAL at 04:46

## 2018-08-14 ASSESSMENT — ACTIVITIES OF DAILY LIVING (ADL)
ADLS_ACUITY_SCORE: 9

## 2018-08-14 NOTE — PLAN OF CARE
Problem: Patient Care Overview  Goal: Plan of Care/Patient Progress Review  Ambulatory Status:  Pt up indpedently  VS:  vss  Pain:  Denies pain other than throat discomfort from NG tube, throat spray applied  Resp: LS clear  GI:  denies nausea.  NPO with ice chips and meds.  BS active.  Passing flatus.  Last BM 8/11  Consults:  surgery  Disposition:  tbd

## 2018-08-14 NOTE — PROGRESS NOTES
sbft reveals contrast in the colon at 30 minutes.  Will remove ng tube and trial clears.    Salma Saravia MD

## 2018-08-14 NOTE — PROGRESS NOTES
"[facility]   General Surgery Progress Note          Assessment and Plan:   Assessment:   Recurrent small bowel obstruction  Past medical history of ulceritive colitis, ileus and neuropathy  Afebrile      Plan:   -Continue NPO/NGT, clamping trials, do not remove.  -Increase activity as tolerated  -Pain mgmt: dilaudid, tylenol  -Await return of bowel function         Interval History:   Resting in bed. Continues to have some discomfort from NGT. Up walking halls. No flatus or BM since yesterday. Reports +bloating, denies nausea/vomiting. Voiding independently. +NPO.         Physical Exam:   Blood pressure 128/78, pulse 83, temperature 98.5  F (36.9  C), temperature source Oral, resp. rate 20, height 1.753 m (5' 9\"), weight 80 kg (176 lb 4.8 oz), SpO2 96 %.    I/O last 3 completed shifts:  In: 2918 [I.V.:2918]  Out: 602 [Urine:2; Emesis/NG output:600]    Abdomen: soft, mildly distneded, NT, rare BS          Data:     Recent Labs   Lab Test  08/13/18   0717  08/12/18   0644  08/11/18   1938   HGB  13.8  16.0  16.9   WBC  9.0  12.1*  12.5*     Abd Xray 8/12/18. IMPRESSION: Mildly dilated loop of small bowel is seen overlying the  left abdomen, which could reflect ongoing obstruction. There is stool  within the colon. No pneumoperitoneum.      Bertha Whittington PA-C         + flatus overnight.  Minimal NG residual after 4 hrs of clamping, however, pt has not passed gas today.  We have discussed the option of a sbft and he is agreeable.  An order was placed for sbft.    The patient has been seen and examined by me.  I agree with the above assessment and plan.  Salma Saravia MD      "

## 2018-08-14 NOTE — PLAN OF CARE
Problem: Patient Care Overview  Goal: Plan of Care/Patient Progress Review  Outcome: Improving  Ambulatory Status:  Pt up up Independently.  VS:  Vital signs:  Temp: 96.4  F (35.8  C) Temp src: Oral BP: 140/75   Heart Rate: 75 Resp: 12 SpO2: 97 % O2 Device: None (Room air)     Pain:  C/o throat irritation-gave chloraseptic Q1-2H.  Resp: LS clear.  GI:  Denies nausea.   NPO except meds and ice chipos.  BS active.  Passing flatus.  Last BM 8/11/18.  :  WDL.  Skin:  WDL.  Tx:  NG tube, LR 125ml/hr  Labs:  K:3.9 Na:139  Disposition:  TBD

## 2018-08-14 NOTE — PLAN OF CARE
Problem: Patient Care Overview  Goal: Plan of Care/Patient Progress Review  VSS. Denied pain. Bowel sounds faint, last gas 2300 8/13, no stool. NG clamped since 0820. Abd none distended/soft, 150 ml's of water w/ meds, no increased pain/nausea. 10 ml residual after 4 hours. Up ind. NPO w/ ice/meds. XR sm. Bowel w/ gastrografin ordered, results pending. Awaiting return of bowel function.

## 2018-08-14 NOTE — PROGRESS NOTES
"[facility]    Hospitalist Progress Note      Assessment & Plan   Shaan Espinoza is an 82 year old male with history of ulcerative colitis who presented to the ED on 8/11/18 with nausea, vomiting, diarrhea and abdominal pain. His UC has been well controlled on mesalamine. He had a colonoscopy about 10 days prior to admissoin which was unremarkable along with biopsies which were negative for active disease. He typically gets \"ileus\" type symptoms about once a year with vomiting, abdominal pain which resolve on their own. However, prior to presentation his symptoms were more severe prompting this presentation. CT showed multiple dilated small bowel loops suggesting bowel obstruction with transition point suggested at the right abdomen.  He was admitted for further treatment of bowel obstruction.        1.  Small bowel obstruction.    Possibly improving with a small amount of flatus, but still few bowel sounds.   -- Surgery following  -- continue NPO, IVF, analgesics, and antiemetics.   -- NG clamped today    2.  Ulcerative colitis. Well controlled on mesalamine (continue). Follows with [facility].       3.  Neuropathy. Continue prior to admission Lyrica when bowel function returns    # Pain Assessment:  Current Pain Score 8/14/2018   Patient currently in pain? denies   Pain score (0-10) -   Pain location -   Pain descriptors -   Shaan wei pain level was assessed and he currently denies pain.      DVT Prophylaxis: Ambulate every shift  Code Status: Full Code    Disposition: Expected discharge pending return of bowel function     Madison Heard MD  Text Page  (7am to 6pm)    Interval History   Passed flatus yesterday. No flatus or BM today. Denies n/v. Main complaint is sore throat from NG.     -Data reviewed today: I reviewed all new labs and imaging results over the last 24 hours. I personally reviewed no images or EKG's today.    Physical Exam   Temp: 98.5  F (36.9  C) Temp src: Oral BP: 128/78 Pulse: " 83 Heart Rate: 75 Resp: 20 SpO2: 96 % O2 Device: None (Room air)    Vitals:    08/11/18 1930 08/11/18 2227   Weight: 77.1 kg (170 lb) 80 kg (176 lb 4.8 oz)     Vital Signs with Ranges  Temp:  [96.4  F (35.8  C)-98.8  F (37.1  C)] 98.5  F (36.9  C)  Pulse:  [75-83] 83  Heart Rate:  [74-75] 75  Resp:  [12-20] 20  BP: (124-143)/(72-79) 128/78  SpO2:  [95 %-98 %] 96 %  I/O last 3 completed shifts:  In: 2918 [I.V.:2918]  Out: 602 [Urine:2; Emesis/NG output:600]    Constitutional: Alert,awake and no apparent distress  Respiratory: CTAB, no wheezing  Cardiovascular: RRR  GI: soft, mildly distended, +bowel sounds  Skin/Integumen: warm and dry  Other:      Medications     lactated ringers 125 mL/hr at 08/14/18 0446       latanoprost  1 drop Both Eyes At Bedtime     mesalamine  1,000 mg Oral At Bedtime     mesalamine  1,500 mg Oral QAM     sodium chloride (PF)  3 mL Intracatheter Q8H       Data     Recent Labs  Lab 08/13/18  0717 08/12/18  0644 08/11/18  1938   WBC 9.0 12.1* 12.5*   HGB 13.8 16.0 16.9   MCV 96 96 93    227 223    139 133   POTASSIUM 3.9 4.3 3.6   CHLORIDE 108 106 98   CO2 27 25 25   BUN 10 14 20   CR 0.86 1.01 0.94   ANIONGAP 5 8 10   RISSA 8.4* 9.0 9.6   GLC 91 107* 169*   ALBUMIN  --   --  4.5   PROTTOTAL  --   --  8.5   BILITOTAL  --   --  1.3   ALKPHOS  --   --  99   ALT  --   --  24   AST  --   --  28   LIPASE  --   --  180       No results found for this or any previous visit (from the past 24 hour(s)).

## 2018-08-15 VITALS
SYSTOLIC BLOOD PRESSURE: 123 MMHG | DIASTOLIC BLOOD PRESSURE: 69 MMHG | OXYGEN SATURATION: 94 % | TEMPERATURE: 97.5 F | RESPIRATION RATE: 16 BRPM | WEIGHT: 176.3 LBS | HEIGHT: 69 IN | BODY MASS INDEX: 26.11 KG/M2 | HEART RATE: 83 BPM

## 2018-08-15 PROCEDURE — A9270 NON-COVERED ITEM OR SERVICE: HCPCS | Mod: GY | Performed by: HOSPITALIST

## 2018-08-15 PROCEDURE — 25000132 ZZH RX MED GY IP 250 OP 250 PS 637: Mod: GY | Performed by: HOSPITALIST

## 2018-08-15 PROCEDURE — 99238 HOSP IP/OBS DSCHRG MGMT 30/<: CPT | Performed by: INTERNAL MEDICINE

## 2018-08-15 PROCEDURE — 99231 SBSQ HOSP IP/OBS SF/LOW 25: CPT | Performed by: SURGERY

## 2018-08-15 PROCEDURE — 25000128 H RX IP 250 OP 636: Performed by: INTERNAL MEDICINE

## 2018-08-15 RX ADMIN — MESALAMINE 1500 MG: 250 CAPSULE ORAL at 08:36

## 2018-08-15 RX ADMIN — SODIUM CHLORIDE, POTASSIUM CHLORIDE, SODIUM LACTATE AND CALCIUM CHLORIDE: 600; 310; 30; 20 INJECTION, SOLUTION INTRAVENOUS at 05:03

## 2018-08-15 ASSESSMENT — ACTIVITIES OF DAILY LIVING (ADL)
ADLS_ACUITY_SCORE: 9

## 2018-08-15 NOTE — PLAN OF CARE
Problem: Bowel Obstruction (Adult)  Goal: Signs and Symptoms of Listed Potential Problems Will be Absent, Minimized or Managed (Bowel Obstruction)  Signs and symptoms of listed potential problems will be absent, minimized or managed by discharge/transition of care (reference Bowel Obstruction (Adult) CPG).   Pt admitted for SBO. Imaging done yesterday with contrast. x3 watery brown BM overnight with x1 episode of incontinence requiring linen change. BS hyperactive, no gas, denies nausea and abdominal pain. VSS and afebrile.Tolerating clear liquid diet. Pt hopeful to advance diet today. Ambulating independently in room. Denies dizziness. IVF @ 125ml/hr. Slept well overnight. Surgery following.

## 2018-08-15 NOTE — DISCHARGE SUMMARY
"[facility]    Discharge Summary  Hospitalist    Date of Admission:  8/11/2018  Date of Discharge:  8/15/2018  Discharging Provider: Madison Heard  Date of Service (when I saw the patient): 08/15/18    Discharge Diagnoses   Small bowel obstruction, resolved.     History of Present Illness   Shaan Espinoza is an 82 year old male with history of ulcerative colitis who presented to the ED on 8/11/18 with nausea, vomiting, diarrhea and abdominal pain. His UC has been well controlled on mesalamine. He had a colonoscopy about 10 days prior to admissoin which was unremarkable along with biopsies which were negative for active disease. He typically gets \"ileus\" type symptoms about once a year with vomiting, abdominal pain which resolve on their own. However, prior to presentation his symptoms were more severe prompting this presentation. CT showed multiple dilated small bowel loops suggesting bowel obstruction with transition point suggested at the right abdomen.  He was admitted for further treatment of bowel obstruction. See H & P for detail.      Hospital Course   Shaan Espinoza was admitted on 8/11/2018.  The following problems were addressed during his hospitalization:    Small bowel obstruction.    Patient was managed conservatively with bowel rest, NG, IVF and analgesics with resolution of symptoms. Diet is gradually advanced to low fiber which he has tolerated prior to discharge. Advised to continue low fiber diet for 1-2 weeks then regular diet. Surgery has followed this hospital stay.     Ulcerative colitis. Well controlled on mesalamine (continued). Follows with [facility].       Neuropathy. Continue prior to admission Lyrica     # Discharge Pain Plan:   - Patient currently has NO PAIN and is not being prescribed pain medications on discharge.    Madison Heard    Significant Results and Procedures   See labs and imaging in epic.     Pending Results     Unresulted Labs Ordered in the Past " 30 Days of this Admission     No orders found from 6/12/2018 to 8/12/2018.          Code Status   Full Code       Primary Care Physician   Eli Juárez        Discharge Disposition   Discharged to home  Condition at discharge: Stable    Consultations This Hospital Stay   SURGERY GENERAL IP CONSULT  NUTRITION SERVICES ADULT IP CONSULT    Time Spent on this Encounter   IMadison, personally saw the patient today and spent  30 minutes discharging this patient.    Discharge Orders     Follow-up and recommended labs and tests    Follow up with primary care provider, Eli Juárez, within 7 days for hospital follow- up.  No follow up labs or test are needed.     Activity   Your activity upon discharge: activity as tolerated     Full Code     Diet   Follow this diet upon discharge: low fiber diet for 2 weeks.       Discharge Medications   Current Discharge Medication List      CONTINUE these medications which have NOT CHANGED    Details   calcium carbonate (OS-RISSA 500 MG [place]. [place]) 500 MG tablet Take 500 mg by mouth daily       glucosamine-chondroitin 500-400 MG CAPS Take 1 capsule by mouth daily      latanoprost (XALATAN) 0.005 % ophthalmic solution Place 1 drop into both eyes At Bedtime      !! mesalamine (PENTASA) 500 MG CPCR CR capsule Take 1,000 mg by mouth At Bedtime      !! mesalamine (PENTASA) 500 MG CPCR CR capsule Take 1,500 mg by mouth every morning      Pregabalin (LYRICA PO) Take 200 mg by mouth 2 times daily as needed        !! - Potential duplicate medications found. Please discuss with provider.      STOP taking these medications       fiber modular (NUTRISOURCE FIBER) packet Comments:   Reason for Stopping:             Allergies   No Known Allergies  Data   Most Recent 3 CBC's:  Recent Labs   Lab Test  08/13/18   0717  08/12/18   0644  08/11/18   1938   WBC  9.0  12.1*  12.5*   HGB  13.8  16.0  16.9   MCV  96  96  93   PLT  190  227  223      Most Recent 3 BMP's:  Recent Labs   Lab Test   08/13/18   0717  08/12/18   0644  08/11/18 1938   NA  140  139  133   POTASSIUM  3.9  4.3  3.6   CHLORIDE  108  106  98   CO2  27  25  25   BUN  10  14  20   CR  0.86  1.01  0.94   ANIONGAP  5  8  10   RISSA  8.4*  9.0  9.6   GLC  91  107*  169*     Most Recent 2 LFT's:  Recent Labs   Lab Test  08/11/18   1938  10/04/15   1713   AST  28  30   ALT  24  25   ALKPHOS  99  100   BILITOTAL  1.3  1.1     Most Recent INR's and Anticoagulation Dosing History:  Anticoagulation Dose History     Recent Dosing and Labs Latest Ref Rng & Units 10/4/2015    INR 0.86 - 1.14 1.01        Most Recent 3 Troponin's:  Recent Labs   Lab Test  10/04/15   1713   TROPI  <0.015  The 99th percentile for upper reference range is 0.045 ug/L.  Troponin values in   the range of 0.045 - 0.120 ug/L may be associated with risks of adverse   clinical events.       Most Recent Cholesterol Panel:No lab results found.  Most Recent 6 Bacteria Isolates From Any Culture (See EPIC Reports for Culture Details):No lab results found.  Most Recent TSH, T4 and A1c Labs:No lab results found.  Results for orders placed or performed during the hospital encounter of 08/11/18   CT Abdomen Pelvis w Contrast    Narrative    CT ABDOMEN AND PELVIS WITH CONTRAST   8/11/2018 8:45 PM     HISTORY: Vomiting, history of SBO, colitis.     TECHNIQUE:  CT abdomen and pelvis with 83 mL Isovue-370 IV. Radiation  dose for this scan was reduced using automated exposure control,  adjustment of the mA and/or kV according to patient size, or iterative  reconstruction technique.    COMPARISON: CT abdomen and pelvis 10/4/2015.    FINDINGS: Multiple proximal to mid small bowel loops are dilated.  Fecalization of a small bowel loop within the right abdomen noted  along with transition to decompression series 2 image 49, coronal  series 3 image 48. Decompressed colon. There is no abscess or free air  identified. Normal appendix.    Liver, gallbladder, adrenals, spleen, pancreas, and kidneys  do not  show any acute abnormalities.      Impression    IMPRESSION:  1. Multiple dilated small bowel loops suggesting bowel obstruction.  Transition point is suggested at the right abdomen.  2. No free air or free fluid. No abscess.    REG BUSTILLO MD   XR Abdomen 2 Views    Narrative    XR ABDOMEN 2 VW 8/12/2018 6:26 AM    HISTORY: Small bowel obstruction.    COMPARISON: October 5, 2015.      Impression    IMPRESSION: Mildly dilated loop of small bowel is seen overlying the  left abdomen, which could reflect ongoing obstruction. There is stool  within the colon. No pneumoperitoneum.    ISABEL CASAS MD   XR Small Bowel    Narrative    TEMPORARY 8/14/2018 3:37 PM     HISTORY: Evaluate for small bowel obstruction.    TECHNIQUE: Limited small bowel study performed by giving patient 120  mL of water-soluble contrast through existing nasogastric tube and  taking immediate and 30 minute films.    COMPARISON: Plain films 8/12/2018 showing findings suspicious for  small bowel obstruction.    FINDINGS: Contrast material courses through the small bowel which is  of normal caliber. At 30 minutes, there is contrast in the right colon  and hepatic flexure excluding complete obstruction of the small bowel.         BREANNA JORGE MD

## 2018-08-15 NOTE — CONSULTS
NUTRITION EDUCATION      REASON FOR ASSESSMENT:  RN Consult - Soft/low res diet for 1-2 weeks    NUTRITION HISTORY:  Information obtained from patient/wife  - they eat a lot of fresh fruit/veg  - wife is an avid , she feels that this diet will be a big change for them  - eats 4-5 meals daily    - toast   - cereal   - yogurt w/ nuts and strawberries   - salad or soup (depends on season)   - dinner entree    CURRENT DIET:  Low fiber    NUTRITION DIAGNOSIS:  Food- and nutrition-related knowledge deficit R/t low fiber diet AEB wife asks appropriate questions, requested RD visit.     INTERVENTIONS:    Nutrition Prescription:  Low fiber diet per surgery     Implementation:      *  Nutrition Education (Content):   A)  Provided handout     Fiber restricted nutrition therapy    B)  Discussed     Foods to take out or limit in diet    Acceptable foods    Removal of peels or skin    Small meals    Chew well/small bites      *  Nutrition Education (Application):   A)  Discussed current eating habits and recommended alternative food choices    Roasted veggies    Peeled fruits    Juice w/ pulp removed    Blended       *  Anticipate good compliance. Wife motivated to avoid recurrence       *  Diet Education - refer to Education Flowsheet    Goals:      *  Patient will verbalize understanding of diet      *  All of the above goals met during the education session    Follow Up/Monitoring:      *  Provided RD contact information for future questions      *  Recommended Out-Patient Nutrition Referral, if further diet instructions are needed      Merry Christopher RD, LD  3rd floor/ICU: [phone]  All other floors: [phone]  Weekend/holiday: [phone]  Office: [phone]

## 2018-08-15 NOTE — PROGRESS NOTES
"[facility]   General Surgery Progress Note          Assessment and Plan:   Assessment:   Recurrent small bowel obstruction - resolved  Past medical history of ulceritive colitis, ileus and neuropathy  SBFT reveals contrast in colon at 30 minutes      Plan:   -Full liquid diet this morning, potentially advance to soft diet tonight  -Increase activity as tolerated  -Pain mgmt: dilaudid, tylenol prn  -Disposition: Possible DC tonight vs tomorrow if tolerates diet advancement. We had a discussion about diet after DC. He should continue soft/low-res for 1-2 weeks. DC instructions in chart.         Interval History:   Sitting up in bed. Feeling good. Happy that NGT tube is out and drinking clears. Tolerating clears just fine. Had multiple loose BMs overnight (4). He wants to advance diet. He also wants to be able to go home tonight.          Physical Exam:   Blood pressure 123/69, pulse 83, temperature 97.5  F (36.4  C), temperature source Oral, resp. rate 16, height 1.753 m (5' 9\"), weight 80 kg (176 lb 4.8 oz), SpO2 94 %.    I/O last 3 completed shifts:  In: 1553 [I.V.:1553]  Out: 150 [Emesis/NG output:150]    Abdomen: soft, Non-distneded, NT, +hyperactive BS          Data:     Recent Labs  Lab 08/13/18  0717 08/12/18  0644 08/11/18  1938   WBC 9.0 12.1* 12.5*   HGB 13.8 16.0 16.9   HCT 40.0 46.4 47.8   MCV 96 96 93    227 223       SBFT 8/14/18. FINDINGS: Contrast material courses through the small bowel which is  of normal caliber. At 30 minutes, there is contrast in the right colon  and hepatic flexure excluding complete obstruction of the small bowel.      Artemio Fonseca PA-C             Advanced to low fiber diet.  Hopeful discharge home this evening on low fiber diet x 2 weeks.  We discussed trialing a limited fiber diet long term due to frequent episodes of obstruction.  The patient has been seen and examined by me.  I agree with the above assessment and plan.  Salma Saravia, " MD

## 2018-08-15 NOTE — PLAN OF CARE
Problem: Bowel Obstruction (Adult)  Goal: Signs and Symptoms of Listed Potential Problems Will be Absent, Minimized or Managed (Bowel Obstruction)  Signs and symptoms of listed potential problems will be absent, minimized or managed by discharge/transition of care (reference Bowel Obstruction (Adult) CPG).   Outcome: Improving    VS: Temp: 98.3  F (36.8  C) Temp src: Oral BP: 152/88 Pulse: 83 Heart Rate: 79 Resp: 16 SpO2: 97 % O2 Device: None (Room air)    Cardio: WNL   Neuro: A&O x4, CMS intact   Resp: RA, no c/o sob, LS clear   GI/: Voiding w/o difficulty; 3 loose brown watery stools on this shift, not passing flatus, BS +, abd discomfort reported   Skin: WNL  Activity: Independent, ambulated frequently in halls this shift   Diet: Clears, tolerating well, no c/o nausea/vomitting   IVs/lines: IVF @ 125 ml/hr   Misc: WBC trending down 9.0   Plan: DC pending return of bowel function; continue plan of care

## 2018-08-15 NOTE — DISCHARGE INSTRUCTIONS
"HOME CARE FOLLOWING BOWEL OBSTRUCTION ADMISSION  GAVIN Kaiser, CARI Saleh, MARIO Brooks     ACTIVITY:  Light Activity -- you may immediately be up and about as tolerated.  Driving -- you may drive when comfortable and off narcotic pain medications.  Light Work -- resume when comfortable off pain medications.  (If you can drive, you probably can work.)  Strenuous Work/Activity -- limit lifting to 15 pounds for 1-2 weeks.  Then, progressively increase with time.  Active Sports (running, biking, etc.) -- cautiously resume after 4 weeks, or when cleared by your surgeon.    DISCOMFORT:  Expect gradual improvement of residual abdominal soreness as your bowel function continues to return to normal over the following 1-2 weeks.  Please contact the office if you have worsening of abdominal pain, or onset of nausea/vomiting.    DIET:  Continue on a \"soft\" diet (i.e. cooked vegetables, soups, processed meats, light/white bread, mashed potatoes, rice, yogurt) for the first one to two week after discharge from the hospital.  After this time, you may return to diet you were on before surgery minimizing high cellulose foods and foods with \"skins\" (i.e.grapes, apple, citrus, corn) only.  This would include limiting: brussel sprouts, cabbage & kale, alfalfa sprouts, zucchini, squash, potatoes, carrots, and yams.  Drink plenty of fluids.    SURGEON CONTACT/APPOINTMENTS:  Office Phone:  [phone]     CONTACT US IF THE FOLLOWING DEVELOPS:   1. A fever that is above 101     2. Severe pain that is not relieved by your prescription.        If you have other questions, please call the office Monday thru Friday between 8am and 5pm to discuss with the nurse or physician assistant.  #[phone]    There is a surgeon ON CALL on weekday evenings and over the weekend in case of urgent need only, and may be contacted at the same number.    If you are having an emergency, call 911 or proceed to your nearest " emergency department.

## 2018-08-15 NOTE — PROGRESS NOTES
"[facility]    Hospitalist Progress Note      Assessment & Plan   Shaan Espinoza is an 82 year old male with history of ulcerative colitis who presented to the ED on 8/11/18 with nausea, vomiting, diarrhea and abdominal pain. His UC has been well controlled on mesalamine. He had a colonoscopy about 10 days prior to admissoin which was unremarkable along with biopsies which were negative for active disease. He typically gets \"ileus\" type symptoms about once a year with vomiting, abdominal pain which resolve on their own. However, prior to presentation his symptoms were more severe prompting this presentation. CT showed multiple dilated small bowel loops suggesting bowel obstruction with transition point suggested at the right abdomen.  He was admitted for further treatment of bowel obstruction.        1.  Small bowel obstruction.    Resolved.   -- tolerated full liquid diet, planning to advance to low fiber diet this afternoon  -- Surgery following, appreciate help  -- discontinue IVF    2.  Ulcerative colitis. Well controlled on mesalamine (continue). Follows with [facility].       3.  Neuropathy. Continue prior to admission Lyrica at discharge    # Pain Assessment:  Current Pain Score 8/15/2018   Patient currently in pain? denies   Pain score (0-10) -   Pain location -   Pain descriptors -   Shaan s pain level was assessed and he currently denies pain.      DVT Prophylaxis: Ambulate every shift  Code Status: Full Code    Disposition: Expected discharge likely later today or tomorrow.      Madison Heard MD  Text Page  (7am to 6pm)    Interval History   NG removed yesterday after barium study which showed contrast in the colon. Passing gas. He had tolerated full liquid diet. Planing to order low fiber diet soon. Patient and wife are interested in meeting with dietitian to discuss about low fiber diet.   He would like to go home today if tolerates diet.     -Data reviewed today: I reviewed all new " labs and imaging results over the last 24 hours. I personally reviewed no images or EKG's today.    Physical Exam   Temp: 97.5  F (36.4  C) Temp src: Oral BP: 123/69   Heart Rate: 75 Resp: 16 SpO2: 94 % O2 Device: None (Room air)    Vitals:    08/11/18 1930 08/11/18 2227   Weight: 77.1 kg (170 lb) 80 kg (176 lb 4.8 oz)     Vital Signs with Ranges  Temp:  [97.5  F (36.4  C)-98.3  F (36.8  C)] 97.5  F (36.4  C)  Heart Rate:  [75-79] 75  Resp:  [16] 16  BP: (117-152)/(69-89) 123/69  SpO2:  [94 %-97 %] 94 %  I/O last 3 completed shifts:  In: 1553 [I.V.:1553]  Out: 150 [Emesis/NG output:150]    Constitutional: Alert,awake and no apparent distress  Respiratory: CTAB, no wheezing  Cardiovascular: RRR  GI: soft, NT and non distended, +bowel sounds  Skin/Integumen: warm and dry  Other:      Medications     lactated ringers 125 mL/hr at 08/15/18 0503       latanoprost  1 drop Both Eyes At Bedtime     mesalamine  1,000 mg Oral At Bedtime     mesalamine  1,500 mg Oral QAM     sodium chloride (PF)  3 mL Intracatheter Q8H       Data     Recent Labs  Lab 08/13/18  0717 08/12/18  0644 08/11/18  1938   WBC 9.0 12.1* 12.5*   HGB 13.8 16.0 16.9   MCV 96 96 93    227 223    139 133   POTASSIUM 3.9 4.3 3.6   CHLORIDE 108 106 98   CO2 27 25 25   BUN 10 14 20   CR 0.86 1.01 0.94   ANIONGAP 5 8 10   RISSA 8.4* 9.0 9.6   GLC 91 107* 169*   ALBUMIN  --   --  4.5   PROTTOTAL  --   --  8.5   BILITOTAL  --   --  1.3   ALKPHOS  --   --  99   ALT  --   --  24   AST  --   --  28   LIPASE  --   --  180       Recent Results (from the past 24 hour(s))   XR Small Bowel    Narrative    TEMPORARY 8/14/2018 3:37 PM     HISTORY: Evaluate for small bowel obstruction.    TECHNIQUE: Limited small bowel study performed by giving patient 120  mL of water-soluble contrast through existing nasogastric tube and  taking immediate and 30 minute films.    COMPARISON: Plain films 8/12/2018 showing findings suspicious for  small bowel  obstruction.    FINDINGS: Contrast material courses through the small bowel which is  of normal caliber. At 30 minutes, there is contrast in the right colon  and hepatic flexure excluding complete obstruction of the small bowel.         BREANNA JORGE MD

## 2018-08-15 NOTE — PLAN OF CARE
Problem: Patient Care Overview  Goal: Plan of Care/Patient Progress Review  VSS. Denied pain. Up ind. Tolerated low fiber diet. Denied nausea. Bowel sounds active, passing gas. Nutrition consulted. Will D.C. home.

## 2018-08-16 NOTE — PLAN OF CARE
Patient hospitalized for SBO. Cleared for discharge to home today per MD. Discharge instructions, medications, and follow-ups reviewed with patient and wife in detail. Patient and wife verbalized understanding of discharge instructions. Belongings were returned to patient at time of discharge. Wife providing transport home.

## 2021-01-26 ENCOUNTER — APPOINTMENT (OUTPATIENT)
Dept: CT IMAGING | Facility: CLINIC | Age: 86
DRG: 389 | End: 2021-01-26
Attending: EMERGENCY MEDICINE
Payer: MEDICARE

## 2021-01-26 ENCOUNTER — HOSPITAL ENCOUNTER (INPATIENT)
Facility: CLINIC | Age: 86
LOS: 3 days | Discharge: HOME | DRG: 389 | End: 2021-01-29
Attending: EMERGENCY MEDICINE | Admitting: INTERNAL MEDICINE
Payer: MEDICARE

## 2021-01-26 DIAGNOSIS — K56.609 SMALL BOWEL OBSTRUCTION (H): ICD-10-CM

## 2021-01-26 LAB
ALBUMIN SERPL-MCNC: 3.9 G/DL (ref 3.4–5)
ALP SERPL-CCNC: 82 U/L (ref 40–150)
ALT SERPL W P-5'-P-CCNC: 17 U/L (ref 0–70)
ANION GAP SERPL CALCULATED.3IONS-SCNC: 8 MMOL/L (ref 3–14)
AST SERPL W P-5'-P-CCNC: 22 U/L (ref 0–45)
BASOPHILS # BLD AUTO: 0 10E9/L (ref 0–0.2)
BASOPHILS NFR BLD AUTO: 0.2 %
BILIRUB SERPL-MCNC: 1.3 MG/DL (ref 0.2–1.3)
BUN SERPL-MCNC: 15 MG/DL (ref 7–30)
CALCIUM SERPL-MCNC: 9.3 MG/DL (ref 8.5–10.1)
CHLORIDE SERPL-SCNC: 101 MMOL/L (ref 94–109)
CO2 SERPL-SCNC: 27 MMOL/L (ref 20–32)
CREAT BLD-MCNC: 0.9 MG/DL (ref 0.66–1.25)
CREAT SERPL-MCNC: 0.84 MG/DL (ref 0.66–1.25)
DIFFERENTIAL METHOD BLD: ABNORMAL
EOSINOPHIL # BLD AUTO: 0 10E9/L (ref 0–0.7)
EOSINOPHIL NFR BLD AUTO: 0.2 %
ERYTHROCYTE [DISTWIDTH] IN BLOOD BY AUTOMATED COUNT: 11.9 % (ref 10–15)
GFR SERPL CREATININE-BSD FRML MDRD: 81 ML/MIN/{1.73_M2}
GFR SERPL CREATININE-BSD FRML MDRD: 81 ML/MIN/{1.73_M2}
GLUCOSE SERPL-MCNC: 147 MG/DL (ref 70–99)
HCT VFR BLD AUTO: 47.2 % (ref 40–53)
HGB BLD-MCNC: 16 G/DL (ref 13.3–17.7)
IMM GRANULOCYTES # BLD: 0 10E9/L (ref 0–0.4)
IMM GRANULOCYTES NFR BLD: 0.4 %
LABORATORY COMMENT REPORT: NORMAL
LACTATE BLD-SCNC: 1.7 MMOL/L (ref 0.7–2)
LIPASE SERPL-CCNC: 102 U/L (ref 73–393)
LYMPHOCYTES # BLD AUTO: 1.2 10E9/L (ref 0.8–5.3)
LYMPHOCYTES NFR BLD AUTO: 10.6 %
MAGNESIUM SERPL-MCNC: 2.1 MG/DL (ref 1.6–2.3)
MCH RBC QN AUTO: 33.1 PG (ref 26.5–33)
MCHC RBC AUTO-ENTMCNC: 33.9 G/DL (ref 31.5–36.5)
MCV RBC AUTO: 98 FL (ref 78–100)
MONOCYTES # BLD AUTO: 0.7 10E9/L (ref 0–1.3)
MONOCYTES NFR BLD AUTO: 6.3 %
NEUTROPHILS # BLD AUTO: 9.4 10E9/L (ref 1.6–8.3)
NEUTROPHILS NFR BLD AUTO: 82.3 %
NRBC # BLD AUTO: 0 10*3/UL
NRBC BLD AUTO-RTO: 0 /100
PLATELET # BLD AUTO: 194 10E9/L (ref 150–450)
POTASSIUM SERPL-SCNC: 3.9 MMOL/L (ref 3.4–5.3)
PROT SERPL-MCNC: 7.6 G/DL (ref 6.8–8.8)
RBC # BLD AUTO: 4.84 10E12/L (ref 4.4–5.9)
SARS-COV-2 RNA RESP QL NAA+PROBE: NEGATIVE
SODIUM SERPL-SCNC: 136 MMOL/L (ref 133–144)
SPECIMEN SOURCE: NORMAL
WBC # BLD AUTO: 11.4 10E9/L (ref 4–11)

## 2021-01-26 PROCEDURE — 99285 EMERGENCY DEPT VISIT HI MDM: CPT | Mod: 25

## 2021-01-26 PROCEDURE — 96375 TX/PRO/DX INJ NEW DRUG ADDON: CPT

## 2021-01-26 PROCEDURE — 250N000011 HC RX IP 250 OP 636: Performed by: INTERNAL MEDICINE

## 2021-01-26 PROCEDURE — 250N000011 HC RX IP 250 OP 636: Performed by: EMERGENCY MEDICINE

## 2021-01-26 PROCEDURE — 82565 ASSAY OF CREATININE: CPT

## 2021-01-26 PROCEDURE — 258N000003 HC RX IP 258 OP 636: Performed by: INTERNAL MEDICINE

## 2021-01-26 PROCEDURE — 96361 HYDRATE IV INFUSION ADD-ON: CPT

## 2021-01-26 PROCEDURE — C9803 HOPD COVID-19 SPEC COLLECT: HCPCS

## 2021-01-26 PROCEDURE — G1004 CDSM NDSC: HCPCS

## 2021-01-26 PROCEDURE — 93005 ELECTROCARDIOGRAM TRACING: CPT

## 2021-01-26 PROCEDURE — 99222 1ST HOSP IP/OBS MODERATE 55: CPT | Mod: AI | Performed by: INTERNAL MEDICINE

## 2021-01-26 PROCEDURE — 120N000001 HC R&B MED SURG/OB

## 2021-01-26 PROCEDURE — 96374 THER/PROPH/DIAG INJ IV PUSH: CPT

## 2021-01-26 PROCEDURE — 87635 SARS-COV-2 COVID-19 AMP PRB: CPT | Performed by: EMERGENCY MEDICINE

## 2021-01-26 PROCEDURE — 83735 ASSAY OF MAGNESIUM: CPT | Performed by: EMERGENCY MEDICINE

## 2021-01-26 PROCEDURE — 83605 ASSAY OF LACTIC ACID: CPT | Performed by: EMERGENCY MEDICINE

## 2021-01-26 PROCEDURE — 85025 COMPLETE CBC W/AUTO DIFF WBC: CPT | Performed by: EMERGENCY MEDICINE

## 2021-01-26 PROCEDURE — 80053 COMPREHEN METABOLIC PANEL: CPT | Performed by: EMERGENCY MEDICINE

## 2021-01-26 PROCEDURE — 83690 ASSAY OF LIPASE: CPT | Performed by: EMERGENCY MEDICINE

## 2021-01-26 PROCEDURE — 258N000003 HC RX IP 258 OP 636: Performed by: EMERGENCY MEDICINE

## 2021-01-26 RX ORDER — NALOXONE HYDROCHLORIDE 0.4 MG/ML
0.4 INJECTION, SOLUTION INTRAMUSCULAR; INTRAVENOUS; SUBCUTANEOUS
Status: DISCONTINUED | OUTPATIENT
Start: 2021-01-26 | End: 2021-01-29 | Stop reason: HOSPADM

## 2021-01-26 RX ORDER — ONDANSETRON 4 MG/1
4 TABLET, ORALLY DISINTEGRATING ORAL EVERY 6 HOURS PRN
Status: DISCONTINUED | OUTPATIENT
Start: 2021-01-26 | End: 2021-01-29 | Stop reason: HOSPADM

## 2021-01-26 RX ORDER — ONDANSETRON 2 MG/ML
4 INJECTION INTRAMUSCULAR; INTRAVENOUS ONCE
Status: COMPLETED | OUTPATIENT
Start: 2021-01-26 | End: 2021-01-26

## 2021-01-26 RX ORDER — LIDOCAINE 40 MG/G
CREAM TOPICAL
Status: DISCONTINUED | OUTPATIENT
Start: 2021-01-26 | End: 2021-01-29 | Stop reason: HOSPADM

## 2021-01-26 RX ORDER — NALOXONE HYDROCHLORIDE 0.4 MG/ML
0.2 INJECTION, SOLUTION INTRAMUSCULAR; INTRAVENOUS; SUBCUTANEOUS
Status: DISCONTINUED | OUTPATIENT
Start: 2021-01-26 | End: 2021-01-27

## 2021-01-26 RX ORDER — ONDANSETRON 2 MG/ML
4 INJECTION INTRAMUSCULAR; INTRAVENOUS ONCE
Status: DISCONTINUED | OUTPATIENT
Start: 2021-01-26 | End: 2021-01-27

## 2021-01-26 RX ORDER — PROCHLORPERAZINE MALEATE 5 MG
5 TABLET ORAL EVERY 6 HOURS PRN
Status: DISCONTINUED | OUTPATIENT
Start: 2021-01-26 | End: 2021-01-29 | Stop reason: HOSPADM

## 2021-01-26 RX ORDER — HYDROMORPHONE HYDROCHLORIDE 1 MG/ML
0.5 INJECTION, SOLUTION INTRAMUSCULAR; INTRAVENOUS; SUBCUTANEOUS
Status: DISCONTINUED | OUTPATIENT
Start: 2021-01-26 | End: 2021-01-26

## 2021-01-26 RX ORDER — IOPAMIDOL 755 MG/ML
500 INJECTION, SOLUTION INTRAVASCULAR ONCE
Status: COMPLETED | OUTPATIENT
Start: 2021-01-26 | End: 2021-01-26

## 2021-01-26 RX ORDER — PROCHLORPERAZINE 25 MG
12.5 SUPPOSITORY, RECTAL RECTAL EVERY 12 HOURS PRN
Status: DISCONTINUED | OUTPATIENT
Start: 2021-01-26 | End: 2021-01-29 | Stop reason: HOSPADM

## 2021-01-26 RX ORDER — HYDROMORPHONE HYDROCHLORIDE 1 MG/ML
.3-.5 INJECTION, SOLUTION INTRAMUSCULAR; INTRAVENOUS; SUBCUTANEOUS
Status: DISCONTINUED | OUTPATIENT
Start: 2021-01-26 | End: 2021-01-29 | Stop reason: HOSPADM

## 2021-01-26 RX ORDER — LEVOTHYROXINE SODIUM 25 UG/1
25 TABLET ORAL DAILY
COMMUNITY

## 2021-01-26 RX ORDER — OXYCODONE HYDROCHLORIDE 5 MG/1
5 TABLET ORAL EVERY 6 HOURS PRN
Status: DISCONTINUED | OUTPATIENT
Start: 2021-01-26 | End: 2021-01-29 | Stop reason: HOSPADM

## 2021-01-26 RX ORDER — ONDANSETRON 2 MG/ML
4 INJECTION INTRAMUSCULAR; INTRAVENOUS EVERY 6 HOURS PRN
Status: DISCONTINUED | OUTPATIENT
Start: 2021-01-26 | End: 2021-01-29 | Stop reason: HOSPADM

## 2021-01-26 RX ORDER — NALOXONE HYDROCHLORIDE 0.4 MG/ML
0.4 INJECTION, SOLUTION INTRAMUSCULAR; INTRAVENOUS; SUBCUTANEOUS
Status: DISCONTINUED | OUTPATIENT
Start: 2021-01-26 | End: 2021-01-27

## 2021-01-26 RX ORDER — SODIUM CHLORIDE 9 MG/ML
INJECTION, SOLUTION INTRAVENOUS CONTINUOUS
Status: DISCONTINUED | OUTPATIENT
Start: 2021-01-26 | End: 2021-01-27

## 2021-01-26 RX ORDER — DEXTROSE MONOHYDRATE, SODIUM CHLORIDE, AND POTASSIUM CHLORIDE 50; 1.49; 4.5 G/1000ML; G/1000ML; G/1000ML
INJECTION, SOLUTION INTRAVENOUS CONTINUOUS
Status: DISCONTINUED | OUTPATIENT
Start: 2021-01-26 | End: 2021-01-28

## 2021-01-26 RX ORDER — NALOXONE HYDROCHLORIDE 0.4 MG/ML
0.2 INJECTION, SOLUTION INTRAMUSCULAR; INTRAVENOUS; SUBCUTANEOUS
Status: DISCONTINUED | OUTPATIENT
Start: 2021-01-26 | End: 2021-01-29 | Stop reason: HOSPADM

## 2021-01-26 RX ADMIN — IOPAMIDOL 88 ML: 755 INJECTION, SOLUTION INTRAVENOUS at 20:52

## 2021-01-26 RX ADMIN — POTASSIUM CHLORIDE, DEXTROSE MONOHYDRATE AND SODIUM CHLORIDE: 150; 5; 450 INJECTION, SOLUTION INTRAVENOUS at 22:20

## 2021-01-26 RX ADMIN — PROCHLORPERAZINE EDISYLATE 5 MG: 5 INJECTION INTRAMUSCULAR; INTRAVENOUS at 22:47

## 2021-01-26 RX ADMIN — ONDANSETRON 4 MG: 2 INJECTION INTRAMUSCULAR; INTRAVENOUS at 20:29

## 2021-01-26 RX ADMIN — HYDROMORPHONE HYDROCHLORIDE 0.5 MG: 1 INJECTION, SOLUTION INTRAMUSCULAR; INTRAVENOUS; SUBCUTANEOUS at 20:30

## 2021-01-26 RX ADMIN — SODIUM CHLORIDE 1000 ML: 9 INJECTION, SOLUTION INTRAVENOUS at 20:29

## 2021-01-26 ASSESSMENT — ENCOUNTER SYMPTOMS
RHINORRHEA: 1
DIARRHEA: 0
ABDOMINAL PAIN: 1
SHORTNESS OF BREATH: 0
NAUSEA: 1
CONSTIPATION: 0
VOMITING: 1
FEVER: 0
CHILLS: 0

## 2021-01-27 LAB
ANION GAP SERPL CALCULATED.3IONS-SCNC: 2 MMOL/L (ref 3–14)
BUN SERPL-MCNC: 12 MG/DL (ref 7–30)
CALCIUM SERPL-MCNC: 8.5 MG/DL (ref 8.5–10.1)
CHLORIDE SERPL-SCNC: 108 MMOL/L (ref 94–109)
CO2 SERPL-SCNC: 28 MMOL/L (ref 20–32)
CREAT SERPL-MCNC: 0.91 MG/DL (ref 0.66–1.25)
ERYTHROCYTE [DISTWIDTH] IN BLOOD BY AUTOMATED COUNT: 11.9 % (ref 10–15)
GFR SERPL CREATININE-BSD FRML MDRD: 78 ML/MIN/{1.73_M2}
GLUCOSE SERPL-MCNC: 120 MG/DL (ref 70–99)
HCT VFR BLD AUTO: 43.1 % (ref 40–53)
HGB BLD-MCNC: 14.6 G/DL (ref 13.3–17.7)
INTERPRETATION ECG - MUSE: NORMAL
MAGNESIUM SERPL-MCNC: 2.3 MG/DL (ref 1.6–2.3)
MCH RBC QN AUTO: 33.2 PG (ref 26.5–33)
MCHC RBC AUTO-ENTMCNC: 33.9 G/DL (ref 31.5–36.5)
MCV RBC AUTO: 98 FL (ref 78–100)
PHOSPHATE SERPL-MCNC: 2.6 MG/DL (ref 2.5–4.5)
PLATELET # BLD AUTO: 197 10E9/L (ref 150–450)
POTASSIUM SERPL-SCNC: 3.9 MMOL/L (ref 3.4–5.3)
RBC # BLD AUTO: 4.4 10E12/L (ref 4.4–5.9)
SODIUM SERPL-SCNC: 138 MMOL/L (ref 133–144)
WBC # BLD AUTO: 10.5 10E9/L (ref 4–11)

## 2021-01-27 PROCEDURE — 99232 SBSQ HOSP IP/OBS MODERATE 35: CPT | Performed by: INTERNAL MEDICINE

## 2021-01-27 PROCEDURE — 258N000003 HC RX IP 258 OP 636: Performed by: INTERNAL MEDICINE

## 2021-01-27 PROCEDURE — 85027 COMPLETE CBC AUTOMATED: CPT | Performed by: INTERNAL MEDICINE

## 2021-01-27 PROCEDURE — 99207 PR CDG-CODE INCORRECT PER BILLING BASED ON TIME: CPT | Performed by: INTERNAL MEDICINE

## 2021-01-27 PROCEDURE — 250N000011 HC RX IP 250 OP 636: Performed by: INTERNAL MEDICINE

## 2021-01-27 PROCEDURE — 250N000013 HC RX MED GY IP 250 OP 250 PS 637: Performed by: INTERNAL MEDICINE

## 2021-01-27 PROCEDURE — 80048 BASIC METABOLIC PNL TOTAL CA: CPT | Performed by: INTERNAL MEDICINE

## 2021-01-27 PROCEDURE — 84100 ASSAY OF PHOSPHORUS: CPT | Performed by: INTERNAL MEDICINE

## 2021-01-27 PROCEDURE — 120N000001 HC R&B MED SURG/OB

## 2021-01-27 PROCEDURE — 36415 COLL VENOUS BLD VENIPUNCTURE: CPT | Performed by: INTERNAL MEDICINE

## 2021-01-27 PROCEDURE — 83735 ASSAY OF MAGNESIUM: CPT | Performed by: INTERNAL MEDICINE

## 2021-01-27 PROCEDURE — 99222 1ST HOSP IP/OBS MODERATE 55: CPT | Performed by: SURGERY

## 2021-01-27 RX ORDER — LEVOTHYROXINE SODIUM 25 UG/1
25 TABLET ORAL DAILY
Status: DISCONTINUED | OUTPATIENT
Start: 2021-01-27 | End: 2021-01-29 | Stop reason: HOSPADM

## 2021-01-27 RX ADMIN — PROCHLORPERAZINE EDISYLATE 5 MG: 5 INJECTION INTRAMUSCULAR; INTRAVENOUS at 08:19

## 2021-01-27 RX ADMIN — POTASSIUM CHLORIDE, DEXTROSE MONOHYDRATE AND SODIUM CHLORIDE: 150; 5; 450 INJECTION, SOLUTION INTRAVENOUS at 21:47

## 2021-01-27 RX ADMIN — POTASSIUM CHLORIDE, DEXTROSE MONOHYDRATE AND SODIUM CHLORIDE: 150; 5; 450 INJECTION, SOLUTION INTRAVENOUS at 08:20

## 2021-01-27 RX ADMIN — MESALAMINE 1000 MG: 250 CAPSULE ORAL at 21:47

## 2021-01-27 RX ADMIN — ENOXAPARIN SODIUM 40 MG: 40 INJECTION SUBCUTANEOUS at 08:19

## 2021-01-27 RX ADMIN — LEVOTHYROXINE SODIUM 25 MCG: 0.03 TABLET ORAL at 13:54

## 2021-01-27 ASSESSMENT — ACTIVITIES OF DAILY LIVING (ADL)
ADLS_ACUITY_SCORE: 12

## 2021-01-27 NOTE — UTILIZATION REVIEW
[facility]   Admission Status; Secondary Review Determination   Admission date:  1/26/2021  7:51 PM    Under the authority of the Utilization Management Committee, the utilization review process indicated a secondary review on the above patient. The review outcome is based on review of the medical records, discussions with staff, and applying clinical experience noted on the date of the review.     (x) Inpatient Status Appropriate - This patient's medical care is consistent with medical management for inpatient care and reasonable inpatient medical practice.     RATIONALE FOR DETERMINATION   85-year-old male with a history of ulcerative colitis, hypothyroidism, and multiple previous bowel obstructions was admitted overnight with abdominal pain and vomiting with CT showing moderate mid small bowel obstruction.  He was placed n.p.o. and started on IV fluids with antiemetics and analgesics.  General surgery is consulted for further evaluation.  This patient is complicated, at high risk for clinical deterioration, requires high level cares, is expected to require a greater than 2 midnight hospitalization and per Medicare guidelines is appropriate for inpatient hospitalization at the time of this review.  The severity of illness, intensity of service provided, expected LOS and risk for adverse outcome make the care complex, high risk and appropriate for hospital admission.    At the time of admission with the information available to the attending physician more than 2 nights Hospital complex care was anticipated, based on patient risk of adverse outcome if treated as outpatient and complex care required.  Inpatient admission is appropriate based on the Medicare guidelines.      The information on this document is developed by the utilization review team in order for the business office to ensure compliance. This only denotes the appropriateness of proper admission status and does not reflect the quality of  care rendered.   The definitions of Inpatient Status and Observation Status used in making the determination above are those provided in the CMS Coverage Manual, Chapter 1 and Chapter 6, section 70.4.     Sincerely,   Trenton Ramirez DO MPH   Physician Advisor  Utilization Review  [facility]

## 2021-01-27 NOTE — PLAN OF CARE
V/S monitored, pt denies pain. Mild nausea, antinausea med given. Up independently. Voiding WDL, starting to pass flatus. Last BM 1/25/21. Hypo bowel sounds. Home once medically clear.

## 2021-01-27 NOTE — PLAN OF CARE
7PM-7AM RN     Patient vital signs are at baseline: No. Elevated BP.   Patient able to ambulate as they were prior to admission or with assist devices provided by therapies during their stay: Yes   Patient MUST void prior to discharge:  Yes  Patient able to tolerate oral intake:  Yes. Ice chips allowed.  Pain has adequate pain control using Oral analgesics:  Yes    Up with SBA. Pain mild upon admission, denied need for IV pain meds. Slight nausea managed with PRN antiemetic. Hypoactive bowels. IV infusing. NPO except for meds/ice chips. Surgical consult today.

## 2021-01-27 NOTE — PROGRESS NOTES
[facility]  Hospitalist Progress Note    Assessment & Plan   Shaan Espinoza is a 85 year old male with a PMHx of ulcerative colitis, hypothyroidism, and multiple SBOs/ileus who was admitted on 1/26/2021 with a SBO.     Patient presented with abdominal pain. CT abdomen/pelvis consistent with moderate grade mid SBO. General surgery consulted. Patient NPO. Antiemetics and pain control provided.     Moderate grade mid-SBO  -General surgery consulted - appreciate assistance  -NPO - diet adv per surgery - likely to clears if passing gas  -No indication for NG tube at this time. Monitor symptoms  -Supportive cares with antiemetics, pain control, IVF (discontinue once oral intake improves) and monitoring    Ulcerative colitis: PTA mesalamine. Follows with [facility]    Hypothyroidism: PTA synthroid    FEN: D5/0.45%/KCL; check phosphorus; NPO adv to clears as tolerated  Activity: As tolerated  DVT Prophylaxis: Enoxaparin (Lovenox) SQ  Family update: Not indicated. Patient to update family. Wife has been present at the hospital.     Code Status: No CPR- Do NOT Intubate    Expected discharge: 2 - 3 days, recommended to prior living arrangement once adequate pain management/ tolerating PO medications.    Christina Velasco, DO    Text Page (7am - 6pm, M-F)    Interval History   Patient is doing well. Pain controlled. Passing some gas. No n/v. No other concerns today. Discussed with nursing.     -Data reviewed today: I reviewed all new labs and imaging results over the last 24 hours. I personally reviewed     Physical Exam   Temp: 98.3  F (36.8  C) Temp src: Temporal BP: 122/69 Pulse: 76   Resp: 16 SpO2: 96 % O2 Device: None (Room air)    Vitals:    01/26/21 1948   Weight: 79.4 kg (175 lb)     Vital Signs with Ranges  Temp:  [97.9  F (36.6  C)-98.6  F (37  C)] 98.3  F (36.8  C)  Pulse:  [] 76  Resp:  [16-24] 16  BP: (122-182)/(69-95) 122/69  SpO2:  [93 %-99 %] 96 %  I/O last 3 completed shifts:  In: -   Out:  300 [Urine:300]    Constitutional: Awake, alert, cooperative, no apparent distress. Non-toxic. Appears stated age.   HEENT: Atraumatic. Normocephalic. Conjunctiva non-injected. Sclera anicteric. MMM.   Respiratory: Moves air bilaterally. Clear to auscultation bilaterally, no crackles or wheezing  Cardiovascular: Regular rate and rhythm, normal S1 and S2, and no murmur noted  GI: Flat, soft, non-tender, non-distended. Hyperactive BS+. No rebound tenderness or guarding.   Skin/Integumen: No rashes, no cyanosis, no edema    Medications     dextrose 5% and 0.45% NaCl + KCl 20 mEq/L 100 mL/hr at 01/27/21 0820       enoxaparin ANTICOAGULANT  40 mg Subcutaneous Daily     levothyroxine  25 mcg Oral Daily     mesalamine ER  1,000 mg Oral At Bedtime     [START ON 1/28/2021] mesalamine ER  1,000 mg Oral QAM     sodium chloride (PF)  3 mL Intracatheter Q8H     Data   Recent Labs   Lab 01/27/21  0631 01/26/21 2024   WBC 10.5 11.4*   HGB 14.6 16.0   MCV 98 98    194    136   POTASSIUM 3.9 3.9   CHLORIDE 108 101   CO2 28 27   BUN 12 15   CR 0.91 0.84   ANIONGAP 2* 8   RISSA 8.5 9.3   * 147*   ALBUMIN  --  3.9   PROTTOTAL  --  7.6   BILITOTAL  --  1.3   ALKPHOS  --  82   ALT  --  17   AST  --  22   LIPASE  --  102     Recent Results (from the past 24 hour(s))   CT Abdomen Pelvis w Contrast    Narrative    EXAM: CT ABDOMEN PELVIS W CONTRAST  LOCATION: [facility]  DATE/TIME: 1/26/2021 8:49 PM    INDICATION: Nausea/vomiting  COMPARISON: 08/11/2018  TECHNIQUE: CT scan of the abdomen and pelvis was performed following injection of IV contrast. Multiplanar reformats were obtained. Dose reduction techniques were used.  CONTRAST: 88mL Isovue-370    FINDINGS:   LOWER CHEST: Minimal dependent atelectasis.    HEPATOBILIARY: Presumed focal fatty infiltration left lobe liver unchanged.    PANCREAS: Normal.    SPLEEN: Normal.    ADRENAL GLANDS: Normal.    KIDNEYS/BLADDER: Normal.    BOWEL: Numerous moderately  dilated fluid-filled loops of proximal and mid small bowel are present consistent with moderate grade small bowel obstruction. There is fecalization of luminal contents near the site of obstruction. No obstructing lesion   evident, likely due to adhesions. No definite site of inflammatory wall thickening. No free perforation.    LYMPH NODES: Normal.    VASCULATURE: Unremarkable.    PELVIC ORGANS: Normal.    MUSCULOSKELETAL: Degenerative changes of spine. No suspicious lesion.      Impression    IMPRESSION:   1.  Moderate grade mid small bowel obstruction, etiology uncertain but likely adhesions.

## 2021-01-27 NOTE — ED NOTES
[facility]  ED Nurse Handoff Report    Shaan Espinoza is a 85 year old male   ED Chief complaint: Vomiting and Abdominal Pain  . ED Diagnosis:   Final diagnoses:   Small bowel obstruction (H)     Allergies: No Known Allergies    Code Status: Full Code  Activity level - Baseline/Home:  Independent. Activity Level - Current:   Stand by Assist. Lift room needed: No. Bariatric: No   Needed: No   Isolation: No. Infection: Not Applicable  COVID r/o and special precautions.     Vital Signs:   Vitals:    01/26/21 1948 01/26/21 2130   BP: (!) 182/95 (!) 178/91   Pulse: 100 98   Resp: 24 20   Temp: 97.9  F (36.6  C) 98.2  F (36.8  C)   TempSrc: Oral Oral   SpO2: 98% 99%   Weight: 79.4 kg (175 lb)        Cardiac Rhythm:  ,      Pain level:    Patient confused: No. Patient Falls Risk: Yes.   Elimination Status: Has voided   Patient Report - Initial Complaint: Abdominal pain. Focused Assessment: Lower abdominal pain, cramping, decreased appetite, firm abdomen. Nausea, vomiting prior to arrival   Tests Performed:   Labs Ordered and Resulted from Time of ED Arrival Up to the Time of Departure from the ED   CBC WITH PLATELETS DIFFERENTIAL - Abnormal; Notable for the following components:       Result Value    WBC 11.4 (*)     MCH 33.1 (*)     Absolute Neutrophil 9.4 (*)     All other components within normal limits   COMPREHENSIVE METABOLIC PANEL - Abnormal; Notable for the following components:    Glucose 147 (*)     All other components within normal limits   LIPASE   LACTIC ACID WHOLE BLOOD   CREATININE POCT   SARS-COV-2 (COVID-19) VIRUS RT-PCR   PERIPHERAL IV CATHETER   CARDIAC CONTINUOUS MONITORING   ISTAT CREATININE NURSING POCT   FREE WATER     CT Abdomen Pelvis w Contrast   Final Result   IMPRESSION:    1.  Moderate grade mid small bowel obstruction, etiology uncertain but likely adhesions.        . Abnormal Results: See above.   Treatments provided: IVF, dilaudid, zofran  Family Comments:  N/A  OBS brochure/video discussed/provided to patient:  No  ED Medications:   Medications   0.9% sodium chloride BOLUS (1,000 mLs Intravenous New Bag 1/26/21 2029)     Followed by   sodium chloride 0.9% infusion (has no administration in time range)   ondansetron (ZOFRAN) injection 4 mg (has no administration in time range)   HYDROmorphone (PF) (DILAUDID) injection 0.5 mg (0.5 mg Intravenous Given 1/26/21 2030)   ondansetron (ZOFRAN) injection 4 mg (4 mg Intravenous Given 1/26/21 2029)   sodium chloride (PF) 0.9% PF flush 100 mL (63 mLs Intravenous Given 1/26/21 2053)   iopamidol (ISOVUE-370) solution 500 mL (88 mLs Intravenous Given 1/26/21 2052)     Drips infusing:  No  For the majority of the shift, the patient's behavior Green. Interventions performed were n/a.    Sepsis treatment initiated: No     Patient tested for COVID 19 prior to admission: Yes    ED Nurse Name/Phone Number: Rey Goddard RN,   9:30 PM    RECEIVING UNIT ED HANDOFF REVIEW    Above ED Nurse Handoff Report was reviewed: YES  Reviewed by: Germaine Cosme RN on January 26, 2021 at 9:45 PM

## 2021-01-27 NOTE — ED TRIAGE NOTES
Pt arrives to the ED due to vomiting that has been ongoing since this AM. Pt states he has not been able to keep much of anything down. Pt states generalized lower abdominal pain. Denies diarrhea. Last BM, last evening and was normal per pt. Denies fevers.

## 2021-01-27 NOTE — CONSULTS
General Surgery Consultation    Shaan Espinoza MRN# [id number]   Age: 85 year old YOB: 1935     Date of Admission:  1/26/2021    Reason for consult:            Small bowel obstruction       Requesting physician:            Jayson Starr MD                Assessment and Plan:   Assessment:   Shaan Espinoza is a 85 year old male with history of ulcerative colitis controlled on mesalamine, no abdominal surgeries, now admitted with a small bowel obstruction. Many SBOs in the past all resolved with conservative management. Pt improving since admission.    Comorbidities:   has a past medical history of Colitis, Ileus (H), and Neuropathy.      Plan:   Continue conservative management with bowel rest, npo. No NG tube indicated at this time.  Possible clear liquid diet later today if passes flatus and nausea is better                 Chief Complaint:   Abdominal pain    History is obtained from the patient         History of Present Illness:   Shaan Espinoza is a 85 year old male who presents with abdominal pain diffusely for the past 2 days.  The pain is intermittent and cramping.  Associated symptoms include with nausea and dry heaving. No real vomiting. Has well controlled ulcerative colitis with no surgical history He has had a colonoscopy.  He does have a history of bowel obstructions in the past. States he has had about a 'dozen' SBOS. Last in 2018. Has been able to control his symptoms well with diet. Follows a low fiber diet, small meals. States these episodes seem to come on after a large Sunday dinner with family. Thinks he eats too much and this precipitates them.  His pain is improved this morning. Feels slightly nauseated but no more dry heaving. No flatus yet.           Past Medical History:     Past Medical History:   Diagnosis Date     Colitis      Ileus (H)      Neuropathy              Past Surgical History:     Past Surgical History:   Procedure Laterality Date     COLONOSCOPY  N/A 2018    Procedure: COMBINED COLONOSCOPY, SINGLE OR MULTIPLE BIOPSY/POLYPECTOMY BY BIOPSY;  Colonoscopy cold forceps biopsies;  Surgeon: Carlos Whaley MD;  Location:  GI             Social History:     Social History     Tobacco Use     Smoking status: Former Smoker     Quit date: 10/4/1962     Years since quittin.3     Smokeless tobacco: Never Used   Substance Use Topics     Alcohol use: Yes             Family History:   No family history on file.         Allergies:   No Known Allergies          Medications:   No current facility-administered medications on file prior to encounter.        calcium carbonate-vitamin D (OSCAL W/D) 500-200 MG-UNIT tablet, Take 1 tablet by mouth daily Takes a powder version of this       latanoprost (XALATAN) 0.005 % ophthalmic solution, Place 1 drop into both eyes daily        levothyroxine (SYNTHROID/LEVOTHROID) 25 MCG tablet, Take 25 mcg by mouth daily       mesalamine (PENTASA) 500 MG CPCR CR capsule, Take 1,000 mg by mouth At Bedtime       mesalamine (PENTASA) 500 MG CPCR CR capsule, Take 1,000 mg by mouth every morning        psyllium (METAMUCIL) 58.6 % packet, Take 1 packet by mouth daily       calcium carbonate (OS-RISSA 500 MG [place]. [place]) 500 MG tablet, Take 500 mg by mouth daily         enoxaparin ANTICOAGULANT  40 mg Subcutaneous Daily     ondansetron  4 mg Intravenous Once     sodium chloride (PF)  3 mL Intracatheter Q8H            Review of Systems:   The 10 point review of systems is negative other than noted in the HPI.          Physical Exam:   /69 (BP Location: Left arm)   Pulse 76   Temp 98.3  F (36.8  C) (Temporal)   Resp 16   Wt 79.4 kg (175 lb)   SpO2 96%   BMI 25.84 kg/m    General - Well developed, well nourished male in no apparent distress  Eyes:  no scleral icterus or redness  Lymphatic: No cervical, or supraclavicular lymphadenopathy  Lungs: breathing comfortably on RA  Heart: regular rate on pulse check  Abdomen:soft, mildly  distended with no tenderness noted diffusely.  No rebound or guarding.  no masses palpated.  MSK: Extremities warm without edema  Neurologic: nonfocal  Psychiatric: Mood and affect appropriate  Skin: Without lesions, rashes, or jaundice         Data:   Labs reviewed  Bmp wnl  Wbc 10.5    Imaging:  All imaging studies reviewed by me and my interpretation of the CT is: dilated loops of small bowel to area of fecalization and distally decompressed loops of bowel.      Vandana Caldwell MD  1/27/2021 8:10 AM     Time spent with the patient, reviewing the EMR, reviewing laboratory and imaging studies, more than 50% of which was counseling and coordinating care:  60 minutes.

## 2021-01-27 NOTE — ED PROVIDER NOTES
History   Chief Complaint  Vomiting and Abdominal Pain    HPI   Shaan Espinoza is a 85 year old male with a history of colitis, ileus, and hypothyroid who presents for evaluation of nausea and vomiting with lower abdominal pain.  Per his report around midnight last night he began to have lower abdominal cramping.  Around 10 AM he then developed nausea with vomiting.  Has been vomiting throughout the day, unable to keep anything down.  He has been trying to drink fluids despite the vomiting.  He denies any stool changes noting a normal BM recently.  He denies fever or chills.  Denies chest pain or shortness of breath.  He did have some rhinorrhea so had a Covid test earlier today when he was not feeling quite so poorly.  He notes his symptoms are very similar to past similar episodes for which he has been admitted without clear cause for why he is having the pain and vomiting.    On epic review, it looks as though he has had a small bowel obstruction, but he denies knowledge of this.      Review of Systems   Constitutional: Negative for chills and fever.   HENT: Positive for rhinorrhea.    Respiratory: Negative for shortness of breath.    Cardiovascular: Negative for chest pain.   Gastrointestinal: Positive for abdominal pain, nausea and vomiting. Negative for constipation and diarrhea.   All other systems reviewed and are negative.      Allergies  NKDA    Medications  Glucosamine-chondroitin  Synthroid  Pentasa  Pregabalin    Past Medical History  Colitis  Ileus  Neuropathy  Glaucoma  Hypothyroid    Past Surgical History  Coloscopy  Right tibia fracture  Right total shoulder arthroplasty  Lumbar disc surgery    Social History  Tobacco use: never smoker  Alcohol use: yes, occasional  Drug use: no, including marijuana  Marital Status:   PCP: Eli Juárez    Physical Exam     Patient Vitals for the past 24 hrs:   BP Temp Temp src Pulse Resp SpO2 Weight   01/26/21 2130 (!) 178/91 98.2  F (36.8  C) Oral 98  20 99 % --   01/26/21 1948 (!) 182/95 97.9  F (36.6  C) Oral 100 24 98 % 79.4 kg (175 lb)       Physical Exam  General: Elderly male sitting upright.  Eyes: PERRL, Conjunctive within normal limits.  No scleral icterus  ENT: Dry mucous membranes, oropharynx clear.   CV: Normal S1S2, no murmur, rub or gallop.  Tachycardic, regular.  Resp: Clear to auscultation bilaterally, no wheezes, rales or rhonchi. Normal respiratory effort.  GI: Abdomen is soft and nondistended.  Diffuse lower abdominal tenderness palpation most prominent in the right mid abdomen. No palpable masses. No rebound or guarding.  MSK: No edema. Nontender. Normal active range of motion.  Skin: Warm and dry. No rashes or lesions or ecchymoses on visible skin.  Neuro: Alert and oriented. Responds appropriately to all questions and commands. No focal findings appreciated. Normal muscle tone.  Psych: Appropriate mood and affect    Emergency Department Course   EKG  Indication: Chest Pain Equivalent  Time: 2006  Rate 84 bpm. NM interval 150. QRS duration 104. QT/QTc 388/458.   Sinus rhythm with occasional premature ventricular complexes  Nonspecific T wave abnormality  Abnormal ECG   Read time: 2022  Read by Reena Lakhani MD    Imaging:  Radiology findings were communicated with the patient who voiced understanding of the findings.    CT Abdomen Pelvis w Contrast  IMPRESSION:   1.  Moderate grade mid small bowel obstruction, etiology uncertain but likely adhesions.    Readings per Radiology    Laboratory:  Laboratory findings were communicated with the patient who voiced understanding of the findings.    CBC: WBC: 11.4 (high), HGB: 16.0, PLT: 194  CMP: Glucose 147 (high), o/w WNL (Creatinine: 0.84)  Creatinine POCT: WNL    Lactic acid (2036): 1.7  Lipase: 102    Asymptomatic Influenza A/B and SARS-CoV2 virus multiplex: pending    Emergency Department Course:  Reviewed:  1951 I reviewed the patient's nursing notes, vitals, past medical records, Care  Everywhere.     Assessments:  1955 I physically examined the patient as documented above.  2113 I re-assessed the patient and updated them on the results of their workup thus far.    Consults:   2128 I consulted with Dr. Starr, hospitalist, regarding the patient's history and presentation here in the emergency department.      Interventions:  2029 NS 1L IV   2029 Zofran 4 mg Iv  2030 Dilaudid 0.5 mg IV    Disposition:  The patient was admitted to the hospital under the care of Dr. Starr.     Impression & Plan   Covid-19  Shaan Espinoza was evaluated during a global COVID-19 pandemic, which necessitated consideration that the patient might be at risk for infection with the SARS-CoV-2 virus that causes COVID-19.   Applicable protocols for evaluation were followed during the patient's care.   COVID-19 was considered as part of the patient's evaluation. The plan for testing is:  a test was obtained during this visit.    Medical Decision Making:  Shaan Espinoza is a 85 year old male who presented to the ER for evaluation of abdominal pain.  He was nontoxic in appearing but did appear uncomfortable.  His abdominal exam showed tenderness that was somewhat localized without peritoneal signs.  Differential diagnosis includes, though is not limited to, bowel obstruction, gastrointestinal perforation, pancreatitis, cholecystitis, biliary colic/cholelithiasis, colitis, acute appendicitis, mesenteric ischemia, paralytic ileus, constipation, DKA, pseudo-obstruction ([place]), referred cardio-pulmonary process, among others.  Based on the above history, examination, and emergency department course, symptoms are most consistent with small bowel obstruction.  Advanced imaging was obtained, demonstrating moderate grade mid small bowel obstruction.   Possible etiologies of obstruction include adhesions, neoplasm, hernia, stricture, trauma, infections, among others.  He did not have any further vomiting in the emergency  department.  After discussion with him, he preferred to avoid an NG tube which seems reasonable.  Symptoms were addressed with the above analgesia and IVF.Serial abdominal exams have not demonstrated signs of peritonitis warranting emergent surgical intervention.  Results of the above imaging and laboratory studies were discussed with the patient.  He will be admitted to the hospital service under the care of Dr. Starr for further symptomatic care, bowel rest, and pain control.  All questions were answered prior to admission.    Diagnosis:    ICD-10-CM    1. Small bowel obstruction (H)  K56.609 Asymptomatic SARS-CoV-2 COVID-19 Virus (Coronavirus) by PCR     CANCELED: Magnesium       Disposition:  Admitted to the hospitalist service, accepted by Dr. Starr    Scribe Disclosure:  I, Ismael Mari, am serving as a scribe at 7:51 PM on 1/26/2021 to document services personally performed by Reena Lakhani MD based on my observations and the provider's statements to me.      Reena Lakhani MD  01/26/21 3594

## 2021-01-27 NOTE — PHARMACY-ADMISSION MEDICATION HISTORY
Admission medication history interview status for this patient is complete. See [facility] admission navigator for allergy information, prior to admission medications and immunization status.     Medication history interview done via telephone during Covid-19 pandemic, indicate source(s): Patient  Medication history resources (including written lists, pill bottles, clinic record):None  Pharmacy: [facility]    Changes made to PTA medication list:  Added: levothyroxine, metamucil  Deleted: lyrica, glucosamine  Changed: latanoprost sig, pentasa dose     Actions taken by pharmacist (provider contacted, etc):None     Additional medication history information:None    Medication reconciliation/reorder completed by provider prior to medication history?  N   (Y/N)     Prior to Admission medications    Medication Sig Last Dose Taking? Auth Provider   calcium carbonate-vitamin D (OSCAL W/D) 500-200 MG-UNIT tablet Take 1 tablet by mouth daily Takes a powder version of this Past Week at Unknown time Yes Unknown, Entered By History   latanoprost (XALATAN) 0.005 % ophthalmic solution Place 1 drop into both eyes daily  1/26/2021 at am Yes Unknown, Entered By History   levothyroxine (SYNTHROID/LEVOTHROID) 25 MCG tablet Take 25 mcg by mouth daily 1/26/2021 at Unknown time Yes Unknown, Entered By History   mesalamine (PENTASA) 500 MG CPCR CR capsule Take 1,000 mg by mouth At Bedtime 1/25/2021 at pm Yes Unknown, Entered By History   mesalamine (PENTASA) 500 MG CPCR CR capsule Take 1,000 mg by mouth every morning  1/26/2021 at am Yes Unknown, Entered By History   psyllium (METAMUCIL) 58.6 % packet Take 1 packet by mouth daily Past Week at Unknown time Yes Unknown, Entered By History   calcium carbonate (OS-RISSA 500 MG [place]. [place]) 500 MG tablet Take 500 mg by mouth daily    Reported, Patient

## 2021-01-28 LAB
ANION GAP SERPL CALCULATED.3IONS-SCNC: 2 MMOL/L (ref 3–14)
BUN SERPL-MCNC: 11 MG/DL (ref 7–30)
CALCIUM SERPL-MCNC: 8.4 MG/DL (ref 8.5–10.1)
CHLORIDE SERPL-SCNC: 110 MMOL/L (ref 94–109)
CO2 SERPL-SCNC: 27 MMOL/L (ref 20–32)
CREAT SERPL-MCNC: 0.94 MG/DL (ref 0.66–1.25)
ERYTHROCYTE [DISTWIDTH] IN BLOOD BY AUTOMATED COUNT: 12.1 % (ref 10–15)
GFR SERPL CREATININE-BSD FRML MDRD: 74 ML/MIN/{1.73_M2}
GLUCOSE SERPL-MCNC: 93 MG/DL (ref 70–99)
HCT VFR BLD AUTO: 38.6 % (ref 40–53)
HGB BLD-MCNC: 12.8 G/DL (ref 13.3–17.7)
MAGNESIUM SERPL-MCNC: 2.1 MG/DL (ref 1.6–2.3)
MCH RBC QN AUTO: 33.5 PG (ref 26.5–33)
MCHC RBC AUTO-ENTMCNC: 33.2 G/DL (ref 31.5–36.5)
MCV RBC AUTO: 101 FL (ref 78–100)
PLATELET # BLD AUTO: 148 10E9/L (ref 150–450)
POTASSIUM SERPL-SCNC: 4 MMOL/L (ref 3.4–5.3)
RBC # BLD AUTO: 3.82 10E12/L (ref 4.4–5.9)
SODIUM SERPL-SCNC: 139 MMOL/L (ref 133–144)
WBC # BLD AUTO: 5.2 10E9/L (ref 4–11)

## 2021-01-28 PROCEDURE — 99232 SBSQ HOSP IP/OBS MODERATE 35: CPT | Performed by: INTERNAL MEDICINE

## 2021-01-28 PROCEDURE — 250N000011 HC RX IP 250 OP 636: Performed by: INTERNAL MEDICINE

## 2021-01-28 PROCEDURE — 80048 BASIC METABOLIC PNL TOTAL CA: CPT | Performed by: INTERNAL MEDICINE

## 2021-01-28 PROCEDURE — 120N000001 HC R&B MED SURG/OB

## 2021-01-28 PROCEDURE — 99231 SBSQ HOSP IP/OBS SF/LOW 25: CPT | Performed by: PHYSICIAN ASSISTANT

## 2021-01-28 PROCEDURE — 85027 COMPLETE CBC AUTOMATED: CPT | Performed by: INTERNAL MEDICINE

## 2021-01-28 PROCEDURE — 83735 ASSAY OF MAGNESIUM: CPT | Performed by: INTERNAL MEDICINE

## 2021-01-28 PROCEDURE — 36415 COLL VENOUS BLD VENIPUNCTURE: CPT | Performed by: INTERNAL MEDICINE

## 2021-01-28 PROCEDURE — 258N000003 HC RX IP 258 OP 636: Performed by: INTERNAL MEDICINE

## 2021-01-28 PROCEDURE — 250N000013 HC RX MED GY IP 250 OP 250 PS 637: Performed by: INTERNAL MEDICINE

## 2021-01-28 RX ORDER — ACETAMINOPHEN 325 MG/1
325 TABLET ORAL EVERY 4 HOURS PRN
Status: DISCONTINUED | OUTPATIENT
Start: 2021-01-28 | End: 2021-01-29 | Stop reason: HOSPADM

## 2021-01-28 RX ADMIN — POTASSIUM CHLORIDE, DEXTROSE MONOHYDRATE AND SODIUM CHLORIDE: 150; 5; 450 INJECTION, SOLUTION INTRAVENOUS at 07:23

## 2021-01-28 RX ADMIN — MESALAMINE 1000 MG: 250 CAPSULE ORAL at 23:51

## 2021-01-28 RX ADMIN — MESALAMINE 1000 MG: 250 CAPSULE ORAL at 08:06

## 2021-01-28 RX ADMIN — ENOXAPARIN SODIUM 40 MG: 40 INJECTION SUBCUTANEOUS at 08:06

## 2021-01-28 RX ADMIN — LEVOTHYROXINE SODIUM 25 MCG: 0.03 TABLET ORAL at 08:06

## 2021-01-28 ASSESSMENT — ACTIVITIES OF DAILY LIVING (ADL)
ADLS_ACUITY_SCORE: 12

## 2021-01-28 NOTE — PROGRESS NOTES
[facility]   General Surgery Progress Note          Assessment and Plan:   Assessment:   Admission for recurrent small bowel obstruction  Resolving - pain resolved, tolerating clears and passing flatus      Plan:   -Ok to try full liquids  -Increase activity as tolerated  -Would like to see more bowel activity (BM) prior to discharge  -Disposition: Could potentially discharge tonight or tomorrow. I recommend he stay on a full liquid diet for a week or so after discharge (instructions in chart).  -will follow peripherally         Interval History:   Resting in bed. He denies pain. He no longer has nausea. His appetite has returned. He is tolerating clear liquids just fine and continues to pass flatus. No BM yet.         Physical Exam:   Blood pressure 111/62, pulse 61, temperature 97.6  F (36.4  C), temperature source Temporal, resp. rate 16, weight 79.4 kg (175 lb), SpO2 97 %.    I/O last 3 completed shifts:  In: 1490 [I.V.:1490]  Out: 875 [Urine:875]    General: alert and oriented, no apparent distress  Abdomen: soft, +mildly distended, NT, +good BS           Data:     Recent Labs   Lab Test 01/28/21  0611 01/27/21  0631 01/26/21 2024   HGB 12.8* 14.6 16.0   WBC 5.2 10.5 11.4*     Recent Labs   Lab 01/28/21  0611 01/27/21  0631 01/26/21 2025 01/26/21 2024    138  --  136   POTASSIUM 4.0 3.9  --  3.9   CHLORIDE 110* 108  --  101   CO2 27 28  --  27   ANIONGAP 2* 2*  --  8   GLC 93 120*  --  147*   BUN 11 12  --  15   CR 0.94 0.91  --  0.84   GFRESTIMATED 74 78 81 81   GFRESTBLACK 86 >90 >90 >90   RISSA 8.4* 8.5  --  9.3          Artemio Fonseca PA-C    feeling very well. Had a BM. Tolerated full liquids. No pain or nausea. Abdomen soft.  Could discharge later today. Pt plans on tomorrow morning per Dr Rod Caldwell MD

## 2021-01-28 NOTE — DISCHARGE INSTRUCTIONS
"HOME CARE FOLLOWING BOWEL OBSTRUCTION ADMISSION  DOUG Kaiser, CARI Saleh R. O Donnell, KENRICK Caldwell    ACTIVITY:  Light Activity -- you may immediately be up and about as tolerated.  Driving -- you may drive when comfortable and off narcotic pain medications.  Light Work -- resume when comfortable off pain medications.  (If you can drive, you probably can work.)  Strenuous Work/Activity -- limit lifting to 15 pounds for 1-2 weeks.  Then, progressively increase with time.  Active Sports (running, biking, etc.) -- cautiously resume after 4 weeks, or when cleared by your surgeon.    DISCOMFORT:  Expect gradual improvement of residual abdominal soreness as your bowel function continues to return to normal over the following 1-2 weeks.  Please contact the office if you have worsening of abdominal pain, or onset of nausea/vomiting.    DIET:  Continue a full liquid diet for about one week after you are discharged from the hospital.  You may then return to the diet you were previosuly on. This should be a \"soft\" diet and include \"low-fiber\" foods. Drink plenty of fluids. Metamucil is Ok and recommended for you.    SURGEON CONTACT/APPOINTMENTS:  Office Phone:  [phone]     CONTACT US IF THE FOLLOWING DEVELOPS:   1. A fever that is above 101     2. Severe pain that is not relieved by your prescription.        If you have other questions, please call the office Monday thru Friday between 8am and 5pm to discuss with the nurse or physician assistant.  [phone]    There is a surgeon ON CALL on weekday evenings and over the weekend in case of urgent need only, and may be contacted at the same number.    If you are having an emergency, call 911 or proceed to your nearest emergency department.  "

## 2021-01-28 NOTE — PLAN OF CARE
Day RN  VS monitored, up independently, DNR/DNI, denies pain, denies N/V, jeni fulls, voiding, bm per pt report, pt hoping to discharge home tomorrow, will cont to monitor.

## 2021-01-28 NOTE — PLAN OF CARE
Pt admitted for SBO. Pt is passing gas, has bowel sounds. Diet advanced to clear liquid diet, tolerated well. No nausea and/or abdominal pain reported. /58   Pulse 71   Temp 97.2  F (36.2  C) (Temporal)   Resp 16   Wt 79.4 kg (175 lb)   SpO2 98%   BMI 25.84 kg/m   on RA. Piv infusing with D5 1/2 NS 20 K. Will continue with poc.

## 2021-01-28 NOTE — PROGRESS NOTES
[facility]  Hospitalist Progress Note    Assessment & Plan   Shaan Espinoza is a 85 year old male with a PMHx of ulcerative colitis, hypothyroidism, and multiple SBOs/ileus who was admitted on 1/26/2021 with a SBO.     Patient presented with abdominal pain. CT abdomen/pelvis consistent with moderate grade mid SBO. General surgery consulted. Patient NPO with slow advancement in diet to full liquids. Antiemetics and pain control provided.     Moderate grade mid-SBO  -General surgery consulted - appreciate assistance  -Full liquid - monitor symptoms with diet advancement - no BM but passing gas. Recommending 1 week of fluid liquids on discharge  -Supportive cares with antiemetics, pain control, and monitoring    Ulcerative colitis: PTA mesalamine. Follows with [facility]    Hypothyroidism: PTA synthroid    FEN: Oral hydration;monitor; full liquid  Activity: As tolerated  DVT Prophylaxis: Enoxaparin (Lovenox) SQ  Family update: Wife present.     Code Status: No CPR- Do NOT Intubate    Expected discharge: Tomorrow, recommended to prior living arrangement once adequate pain management/ tolerating PO medications.    Christina Velasco, DO    Text Page (7am - 6pm, M-F)    Interval History   Doing well today. Tolerating diet. No abdominal pain. No BM but passing gas. No N/V. Afebrile. Ambulating. Discussed with nursing.      -Data reviewed today: I reviewed all new labs and imaging results over the last 24 hours. I personally reviewed     Physical Exam   Temp: 97.6  F (36.4  C) Temp src: Temporal BP: 111/62 Pulse: 61   Resp: 16 SpO2: 97 % O2 Device: None (Room air)    Vitals:    01/26/21 1948   Weight: 79.4 kg (175 lb)     Vital Signs with Ranges  Temp:  [97.2  F (36.2  C)-99.2  F (37.3  C)] 97.6  F (36.4  C)  Pulse:  [61-71] 61  Resp:  [16] 16  BP: (101-111)/(52-62) 111/62  SpO2:  [97 %-98 %] 97 %  I/O last 3 completed shifts:  In: 1490 [I.V.:1490]  Out: 875 [Urine:875]    Constitutional: Awake, alert,  cooperative, no apparent distress. Non-toxic. Appears stated age.   HEENT: Atraumatic. Normocephalic. Conjunctiva non-injected. Sclera anicteric. MMM.   Respiratory: Moves air bilaterally. Clear to auscultation bilaterally, no crackles or wheezing  Cardiovascular: Regular rate and rhythm, normal S1 and S2, and no murmur noted  GI: Flat, soft, non-tender, non-distended. Hyperactive BS+. No rebound tenderness or guarding.   Skin/Integumen: No rashes, no cyanosis, no edema    Medications       enoxaparin ANTICOAGULANT  40 mg Subcutaneous Daily     levothyroxine  25 mcg Oral Daily     mesalamine ER  1,000 mg Oral At Bedtime     mesalamine ER  1,000 mg Oral QAM     sodium chloride (PF)  3 mL Intracatheter Q8H     Data   Recent Labs   Lab 01/28/21  0611 01/27/21  0631 01/26/21 2024   WBC 5.2 10.5 11.4*   HGB 12.8* 14.6 16.0   * 98 98   * 197 194    138 136   POTASSIUM 4.0 3.9 3.9   CHLORIDE 110* 108 101   CO2 27 28 27   BUN 11 12 15   CR 0.94 0.91 0.84   ANIONGAP 2* 2* 8   RISSA 8.4* 8.5 9.3   GLC 93 120* 147*   ALBUMIN  --   --  3.9   PROTTOTAL  --   --  7.6   BILITOTAL  --   --  1.3   ALKPHOS  --   --  82   ALT  --   --  17   AST  --   --  22   LIPASE  --   --  102     No results found for this or any previous visit (from the past 24 hour(s)).

## 2021-01-28 NOTE — PLAN OF CARE
Pt up independently. Denies pain. LS clear, BS active, passing flatus. No abdominal tenderness. LBM 1/25/21. CMS intact. IV infusing in R arm. Clear liquids tolerated.

## 2021-01-28 NOTE — PROGRESS NOTES
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  I asked if he would like a visit this morning.  He said that he would like a visit and invited me to conversation.  He said he was glad to have someone visit as he had been an [employer]  during his working years.       I asked if there was anything in particular he would like me to lift up.  He stated that he would like a prayer for healing, specifically for the reason for his hospital stay.  I offered a prayer for his healing and blessing and gave thanks for his life.     Shaan invited me to conversation further.  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      I gave thanks again for his perspective and commented on the moses view he had of the nearby [facility]. I prayed for the light of Alexx to be with him during his stay.  He thanked me for my time and invited me to leave his door open to, \"have a bit of interaction with the world.\"    I relayed to pt that spiritual health would remain accessible if needed.           Leonid Hernandez   Intern  "

## 2021-01-29 VITALS
BODY MASS INDEX: 25.84 KG/M2 | TEMPERATURE: 97.1 F | HEART RATE: 65 BPM | SYSTOLIC BLOOD PRESSURE: 104 MMHG | OXYGEN SATURATION: 96 % | RESPIRATION RATE: 16 BRPM | WEIGHT: 175 LBS | DIASTOLIC BLOOD PRESSURE: 56 MMHG

## 2021-01-29 LAB
MAGNESIUM SERPL-MCNC: 2.2 MG/DL (ref 1.6–2.3)
POTASSIUM SERPL-SCNC: 4 MMOL/L (ref 3.4–5.3)

## 2021-01-29 PROCEDURE — 83735 ASSAY OF MAGNESIUM: CPT | Performed by: INTERNAL MEDICINE

## 2021-01-29 PROCEDURE — 99239 HOSP IP/OBS DSCHRG MGMT >30: CPT | Performed by: INTERNAL MEDICINE

## 2021-01-29 PROCEDURE — 84132 ASSAY OF SERUM POTASSIUM: CPT | Performed by: INTERNAL MEDICINE

## 2021-01-29 PROCEDURE — 250N000011 HC RX IP 250 OP 636: Performed by: INTERNAL MEDICINE

## 2021-01-29 PROCEDURE — 250N000013 HC RX MED GY IP 250 OP 250 PS 637: Performed by: INTERNAL MEDICINE

## 2021-01-29 PROCEDURE — 36415 COLL VENOUS BLD VENIPUNCTURE: CPT | Performed by: INTERNAL MEDICINE

## 2021-01-29 RX ADMIN — MESALAMINE 1000 MG: 250 CAPSULE ORAL at 08:49

## 2021-01-29 RX ADMIN — LEVOTHYROXINE SODIUM 25 MCG: 0.03 TABLET ORAL at 08:50

## 2021-01-29 RX ADMIN — ENOXAPARIN SODIUM 40 MG: 40 INJECTION SUBCUTANEOUS at 08:49

## 2021-01-29 ASSESSMENT — ACTIVITIES OF DAILY LIVING (ADL)
ADLS_ACUITY_SCORE: 12

## 2021-01-29 NOTE — DISCHARGE SUMMARY
[facility]  Discharge Summary Hospitalist    Date of Admission:  1/26/2021  Date of Discharge:  1/29/2021  Discharging Provider: Christina Velasco DO  Date of Service (when I saw the patient): 01/29/21    Discharge Diagnoses   Recurrent moderate grade mid small bowel obstruction  Ulcerative colitis  Hypothyroidism    History of Present Illness   Shaan Espinoza is a 85 year old male with a PMHx of ulcerative colitis, hypothyroidism, and multiple SBOs/ileus who was admitted on 1/26/2021 with a SBO.     Hospital Course   Shaan Espinoza was admitted on 1/26/2021.  The following problems were addressed during his hospitalization:    Recurrent moderate grade mid small bowel obstruction: Patient presented to [facility] with acute abdominal pain.  CT abdomen/pelvis was consistent with moderate grade mid small bowel obstruction.  Patient did not require abdominal decompression with an NG tube.  Supportive cares were provided.  General surgery was consulted and monitored patient.  Diet was slowly advanced and he was tolerating a full liquid diet on discharge.  It was recommended that he continue with the full liquid diet for 1 week and then return to a low fiber diet.  Nutrition counseling was provided.  Family was updated on care plan.  Patient to follow-up primary care provider    Christina Velasco DO    Significant Results and Procedures   See below imaging    Pending Results  None    Code Status   Full Code       Primary Care Physician   Pablo Jolley    Physical Exam   Temp: 97.1  F (36.2  C) Temp src: Temporal BP: 104/56 Pulse: 65   Resp: 16 SpO2: 96 % O2 Device: None (Room air)    Vitals:    01/26/21 1948   Weight: 79.4 kg (175 lb)     Vital Signs with Ranges  Temp:  [96.7  F (35.9  C)-98.3  F (36.8  C)] 97.1  F (36.2  C)  Pulse:  [63-65] 65  Resp:  [16] 16  BP: (104-118)/(56-58) 104/56  SpO2:  [96 %-99 %] 96 %  I/O last 3 completed shifts:  In: 1857 [P.O.:750; I.V.:1107]  Out: 425  [Urine:425]    Reviewed examination by nursing.  Patient discharged prior to being able to perform a physical exam.    Discharge Disposition   Discharged to home  Condition at discharge: Stable    Consultations This Hospital Stay   SURGERY GENERAL IP CONSULT    Time Spent on this Encounter   IChristina DO, personally saw the patient today and spent greater than 30 minutes discharging this patient.    Discharge Orders      Reason for your hospital stay    Small bowel obstruction     Follow-up and recommended labs and tests     Follow up with primary care provider, Pablo Jolley, within 7 days for hospital follow- up.  No follow up labs or test are needed.     Activity    Your activity upon discharge: activity as tolerated     Full Code     Diet    Follow this diet upon discharge: Orders Placed This Encounter      Full Liquid Diet for 7 days (2/5/21)     Discharge Medications   Discharge Medication List as of 1/29/2021 10:03 AM      CONTINUE these medications which have NOT CHANGED    Details   calcium carbonate (OS-RISSA 500 MG Jamul. CA) 500 MG tablet Take 500 mg by mouth daily , Historical      calcium carbonate-vitamin D (OSCAL W/D) 500-200 MG-UNIT tablet Take 1 tablet by mouth daily Takes a powder version of this, Historical      latanoprost (XALATAN) 0.005 % ophthalmic solution Place 1 drop into both eyes daily , Historical      levothyroxine (SYNTHROID/LEVOTHROID) 25 MCG tablet Take 25 mcg by mouth daily, Historical      !! mesalamine (PENTASA) 500 MG CPCR CR capsule Take 1,000 mg by mouth At Bedtime, Historical      !! mesalamine (PENTASA) 500 MG CPCR CR capsule Take 1,000 mg by mouth every morning , Historical      psyllium (METAMUCIL) 58.6 % packet Take 1 packet by mouth daily, Historical       !! - Potential duplicate medications found. Please discuss with provider.        Allergies   No Known Allergies  Data   Most Recent 3 CBC's:  Recent Labs   Lab Test 01/28/21  0611 01/27/21  0631 01/26/21 2024    WBC 5.2 10.5 11.4*   HGB 12.8* 14.6 16.0   * 98 98   * 197 194      Most Recent 3 BMP's:  Recent Labs   Lab Test 01/29/21  0740 01/28/21  0611 01/27/21  0631 01/26/21 2024   NA  --  139 138 136   POTASSIUM 4.0 4.0 3.9 3.9   CHLORIDE  --  110* 108 101   CO2  --  27 28 27   BUN  --  11 12 15   CR  --  0.94 0.91 0.84   ANIONGAP  --  2* 2* 8   RISSA  --  8.4* 8.5 9.3   GLC  --  93 120* 147*     Most Recent 2 LFT's:  Recent Labs   Lab Test 01/26/21 2024 08/11/18  1938   AST 22 28   ALT 17 24   ALKPHOS 82 99   BILITOTAL 1.3 1.3     Most Recent INR's and Anticoagulation Dosing History:  Anticoagulation Dose History     Recent Dosing and Labs Latest Ref Rng & Units 10/4/2015    INR 0.86 - 1.14 1.01        Most Recent 3 Troponin's:  Recent Labs   Lab Test 10/04/15  1713   TROPI <0.015  The 99th percentile for upper reference range is 0.045 ug/L.  Troponin values in   the range of 0.045 - 0.120 ug/L may be associated with risks of adverse   clinical events.       Most Recent Cholesterol Panel:No lab results found.  Most Recent 6 Bacteria Isolates From Any Culture (See EPIC Reports for Culture Details):No lab results found.  Most Recent TSH, T4 and A1c Labs:No lab results found.  Results for orders placed or performed during the hospital encounter of 01/26/21   CT Abdomen Pelvis w Contrast    Narrative    EXAM: CT ABDOMEN PELVIS W CONTRAST  LOCATION: [facility]  DATE/TIME: 1/26/2021 8:49 PM    INDICATION: Nausea/vomiting  COMPARISON: 08/11/2018  TECHNIQUE: CT scan of the abdomen and pelvis was performed following injection of IV contrast. Multiplanar reformats were obtained. Dose reduction techniques were used.  CONTRAST: 88mL Isovue-370    FINDINGS:   LOWER CHEST: Minimal dependent atelectasis.    HEPATOBILIARY: Presumed focal fatty infiltration left lobe liver unchanged.    PANCREAS: Normal.    SPLEEN: Normal.    ADRENAL GLANDS: Normal.    KIDNEYS/BLADDER: Normal.    BOWEL: Numerous moderately  dilated fluid-filled loops of proximal and mid small bowel are present consistent with moderate grade small bowel obstruction. There is fecalization of luminal contents near the site of obstruction. No obstructing lesion   evident, likely due to adhesions. No definite site of inflammatory wall thickening. No free perforation.    LYMPH NODES: Normal.    VASCULATURE: Unremarkable.    PELVIC ORGANS: Normal.    MUSCULOSKELETAL: Degenerative changes of spine. No suspicious lesion.      Impression    IMPRESSION:   1.  Moderate grade mid small bowel obstruction, etiology uncertain but likely adhesions.

## 2021-01-29 NOTE — PLAN OF CARE
Reviewed discharge instructions and medications with patient and wife, Diamond. Questions answered. Patient discharged to home with wife driving, discharge instructions, and belongings at this time.

## 2021-01-29 NOTE — PLAN OF CARE
/57 (BP Location: Left arm)   Pulse 63   Temp 98.3  F (36.8  C) (Temporal)   Resp 16   Wt 79.4 kg (175 lb)   SpO2 96%   BMI 25.84 kg/m    Orientation: A&Ox4  Resp:LS Clear, RA  Pain: denies  Activity: up independently, walking the halls frequently.  Diet: tolerating full liquids  Voiding: spontaneously without difficulty  Discharge Plan: home tomorrow

## 2021-01-29 NOTE — PLAN OF CARE
Noc RN-  Pt resting comfortably this shift. Denies pain or nausea. Pt independent in room, tolerating full liquid diet. Vital signs stable

## 2021-02-07 ENCOUNTER — HEALTH MAINTENANCE LETTER (OUTPATIENT)
Age: 86
End: 2021-02-07

## 2021-02-18 ENCOUNTER — DOCUMENTATION ONLY (OUTPATIENT)
Dept: OTHER | Facility: CLINIC | Age: 86
End: 2021-02-18

## 2021-05-11 ENCOUNTER — HOSPITAL ENCOUNTER (OUTPATIENT)
Dept: GENERAL RADIOLOGY | Facility: CLINIC | Age: 86
End: 2021-05-11
Attending: PHYSICIAN ASSISTANT
Payer: MEDICARE

## 2021-05-11 ENCOUNTER — HOSPITAL ENCOUNTER (OUTPATIENT)
Dept: SPEECH THERAPY | Facility: CLINIC | Age: 86
End: 2021-05-11
Attending: PHYSICIAN ASSISTANT
Payer: MEDICARE

## 2021-05-11 ENCOUNTER — HOSPITAL ENCOUNTER (OUTPATIENT)
Dept: GENERAL RADIOLOGY | Facility: CLINIC | Age: 86
Discharge: HOME | End: 2021-05-11
Attending: PHYSICIAN ASSISTANT | Admitting: PHYSICIAN ASSISTANT
Payer: MEDICARE

## 2021-05-11 DIAGNOSIS — R13.10 DYSPHAGIA, UNSPECIFIED TYPE: ICD-10-CM

## 2021-05-11 DIAGNOSIS — R13.10 DYSPHAGIA, UNSPECIFIED TYPE: Primary | ICD-10-CM

## 2021-05-11 PROCEDURE — 92526 ORAL FUNCTION THERAPY: CPT | Mod: GN

## 2021-05-11 PROCEDURE — 74230 X-RAY XM SWLNG FUNCJ C+: CPT

## 2021-05-11 PROCEDURE — 92611 MOTION FLUOROSCOPY/SWALLOW: CPT | Mod: GN

## 2021-05-11 RX ORDER — BARIUM SULFATE 400 MG/ML
SUSPENSION ORAL ONCE
Status: COMPLETED | OUTPATIENT
Start: 2021-05-11 | End: 2021-05-11

## 2021-05-11 RX ADMIN — BARIUM SULFATE: 400 SUSPENSION ORAL at 10:26

## 2021-05-11 NOTE — PROGRESS NOTES
"   05/11/21 1052   General Information   Type Of Visit Initial   Start Of Care Date 05/11/21   Referring Physician Jessica MCCAULEY ([facility])   Orders Evaluate And Treat   Medical Diagnosis dysphaiga, unspecified type (R13.10)   Onset Of Illness/injury Or Date Of Surgery 04/21/21  (date evaluation was ordered)   Pertinent History of Current Problem/OT: Additional Occupational Profile Info Referral for video fluoroscopic swallow study (VFSS)  from pt's [facility] provider given the following dysphagia symptoms: coughing a lot when eating, food and larger pills hang up in throat at mid-pharyngeal level (specifically \"harder\" foods like crackers, rice), occassionally coughing food back up ~ 15 minutes later; he reports difficulty with liquids (occassional coughing) only when taking medications at night, no issues during the day. He reports his dysphagia has been progressively worsening over the last year. Pt reports some weight loss within the last 6 months which he associates with SBO (1/2021) and tooth extraction/implant (3/2021) and not 2/2 his dysphagia symptoms. He denies any respiratory infections/PNA's. He does have occassional wet vocal quality/throat clearing during interview/prior to PO trials during VFSS.    Abuse Screen (yes response referral indicated)   Feels Unsafe at Home or Work/School no   Feels Threatened by Someone no   Does Anyone Try to Keep You From Having Contact with Others or Doing Things Outside Your Home? no   Physical Signs of Abuse Present no   VFSS Evaluation   VFSS Additional Documentation Yes   VFSS Eval: Radiology   Radiologist Dr. Johnson   Views Taken left lateral;A/P   Physical Location of Procedure [facility], Radiology Dept, Fluoroscopy Suite   VFSS Eval: Thin Liquid Texture Trial   Mode of Presentation, Thin Liquid cup;straw;self-fed   Order of Presentation 1, 2, 3, 5, 6 (chin tuck-not effective), 8   Preparatory Phase WFL   Oral Phase, Thin Liquid Premature " pharyngeal entry   Pharyngeal Phase, Thin Liquid Delayed swallow reflex;Residue in valleculae;Residue in pyriform sinus;UES dysfunction   Rosenbek's Penetration Aspiration Scale: Thin Liquid Trial Results 7 - contrast passes glottis, visible subglottic residue remains despite patient's response (aspiration)   Response to Aspiration unproductive reflexive involuntary cough/throat clear   Successful Strategies Trialed During Procedure, Thin Liquid   (chin tuck not effective)   Diagnostic Statement Mild-moderate vallecular/mild pyriform sinus residuals. Initially, no penetration or aspiraiton noted with thin liquids in isolation via cup. Trace before/during penetration to the cords with thin via straw and x1 with thin liquid via cup following puree bolus. X1 aspiration sensed by cough with thin liquids following regular solid bolus. Penetration and aspiration occuring given incomplete laryngeal closure combined with increased spillage to pyriform sinuses 2/2 straw use and solid vallecular residuals.    VFSS Eval: Nectar Thick Liquid Texture Trial   Mode of Presentation, [place]odhiss self-fed   Order of Presentation 9   Preparatory Phase WFL   Oral Phase, Nectar WFL   Pharyngeal Phase, Nectar Residue in valleculae;Residue in pyriform sinus   Rosenbek's Penetration Aspiration Scale: Nectar-Thick Liquid Trial Results 1 - no aspiration, contrast does not enter airway   Diagnostic Statement no penetration or aspiration though increased pharyngeal residuals compared to thin   VFSS Eval: Puree Solid Texture Trial   Mode of Presentation, Puree spoon;self-fed   Order of Presentation 4   Preparatory Phase WFL   Oral Phase, Puree WFL   Pharyngeal Phase, Puree Residue in valleculae;Residue in pyriform sinus   Rosenbek's Penetration Aspiration Scale: Puree Food Trial Results 1 - no aspiration, contrast does not enter airway   Diagnostic Statement moderate-severe vallecular residuals, moderate pyriform sinus residuals   VFSS Eval: Solid  Food Texture Trial   Mode of Presentation, Solid self-fed   Order of Presentation 7   Preparatory Phase WFL   Oral Phase, Solid WFL   Pharyngeal Phase, Solid Residue in valleculae;Residue in pyriform sinus   Rosenbek's Penetration Aspiration Scale: Solid Food Trial Results 1 - no aspiration, contrast does not enter airway   Diagnostic Statement Severe vallecular residuals, moderate pyriform sinus residuals   General Therapy Interventions   Planned Therapy Interventions Dysphagia Treatment   Dysphagia treatment Oropharyngeal exercise training;Electrical stimulation;Instruction of safe swallow strategies   Swallow Eval: Clinical Impressions   Skilled Criteria for Therapy Intervention Skilled criteria met.  Treatment indicated.   Functional Assessment Scale (FAS) 4   Dysphagia Outcome Severity Scale (FRANCIA) Level 4 - FRANCIA   Treatment Diagnosis mild-moderate oropharyngeal dysphagia   Diet texture recommendations Dysphagia diet level 3;Thin liquids   Recommended Feeding/Eating Techniques alternate between small bites and sips of food/liquid;hard swallow w/ each bite or sip;maintain upright posture during/after eating for 30 mins;small sips/bites;no straws   Rehab Potential good, to achieve stated therapy goals   Therapy Frequency   (1-2x/week for 4-6 weeks)   Anticipated Discharge Disposition home w/ outpatient services   Risks and Benefits of Treatment have been explained. Yes   Patient, family and/or staff in agreement with Plan of Care Yes   Clinical Impression Comments Pt presents with  mild-moderate oropharyngeal dysphagia on video fluoroscopic swallow study. Pt initially presented with relatively timely swallow, triggered at base of tongue/valleculae, however increased spillage of  thin liquids to the pyriform sinuses with straw use and following solid consumption/liquid wash given degree of vallecular residuals. Base of tongue (BOT) retraction judged mild-mod reduced, hyolaryngeal elevation mod reduced/excursion  mildly reduced, mod-severe reduced/incomplete epiglottic inversion. Prominent cricopharyngeus (CP) and reduced upper esophageal sphincter relaxation noted during the swallow.     Given prominent CP and reduced UES relaxation: mild-moderate pyriform sinuses residuals, fully clearing with extra dry swallow on 100% of trials.     Given reduced BOT retraction, reduced hyolaryngeal ROM/incomplete epiglottic invesrion: vallecular residuals noted across all consistencies (mild-mod with thin, mod with nectar, mod-sever with puree, severe with regular). Double swallows and liquid wash minimally reducing, never clearing.     Initially, no penetration or aspiraiton noted with thin liquids in isolation via cup. Trace before/during penetration to the cords with thin via straw and x1 with thin liquid via cup following puree bolus. X1 aspiration sensed by cough with thin liquids following regular solid bolus. Penetration and aspiration occuring given incomplete laryngeal closure combined with increased spillage to pyriform sinuses 2/2 straw use and solid vallecular residuals.     Recommendations:   1) dysphagia diet level 3 (softer, moister), thin liquids (no straws).   2) Strategies: small bites, 2-3 swallows per bite, alternate between solids/liquids consistently, periodic cough and re-swallow for airway clearance.   3) Recommend OP SLP tx for oropharyngeal exercise program with likely repeat VFSS after 4-6 weeks of exercises to monitor improvements. Pt in agreement with recommendations/POC. Written handouts and video demonstration provided.      Swallow Goals   SLP Swallow Goals 1;2   Swallow Goal 1   Goal Identifier goal 1   Goal Description Pt will complete oropharyngeal exercises targetting BOT retraction, hyolaryngeal elevation/excursion/ epiglottic inversion and UES relaxation x20+ independently using only cues from written handout   Target Date 08/11/21   Swallow Goal 2   Goal Identifier goal 2   Goal Description Pt will  utilize swallow strategies across PO consumption with no more than 2 verbal reminders.   Target Date 08/11/21   Total Session Time   SLP Eval: VideoFluoroscopic Swallow function Minutes (23893) 17   Total Evaluation Time 17   Therapy Certification   Certification date from 05/11/21   Certification date to 08/11/21   Medical Diagnosis dysphaiga, unspecified type (R13.10)   Certification I certify the need for these services furnished under this plan of treatment and while under my care.  (Physician co-signature of this document indicates review and certification of the therapy plan).

## 2021-05-11 NOTE — PROGRESS NOTES
[facility]          OUTPATIENT SWALLOW  EVALUATION  PLAN OF TREATMENT FOR OUTPATIENT REHABILITATION  (COMPLETE FOR INITIAL CLAIMS ONLY)  Patient's Last Name, First Name, M.I.  YOB: 1935  Shaan Espinoza     Provider's Name   [facility]   Medical Record No.  [id number]     Start of Care Date:  05/11/21   Onset Date:  04/21/21(date evaluation was ordered)   Type:     ___PT   ____OT  ___X_SLP Medical Diagnosis:  dysphaiga, unspecified type (R13.10)     Treatment Diagnosis:  mild-moderate oropharyngeal dysphagia Visits from SOC:  1     _________________________________________________________________________________  Plan of Treatment/Functional Goals:  Planned Therapy Interventions: Dysphagia Treatment  Dysphagia treatment: Oropharyngeal exercise training, Electrical stimulation, Instruction of safe swallow strategies                     Goals   1. Goal Identifier: goal 1       Goal Description: Pt will complete oropharyngeal exercises targetting BOT retraction, hyolaryngeal elevation/excursion/ epiglottic inversion and UES relaxation x20+ independently using only cues from written handout       Target Date: 08/11/21           2. Goal Identifier: goal 2       Goal Description: Pt will utilize swallow strategies across PO consumption with no more than 2 verbal reminders.       Target Date: 08/11/21           3.                             4.                             5.                            6.                              7.                              8.                              Therapy Frequency: (1-2x/week for 4-6 weeks)       Tish Deng, SLP       I CERTIFY THE NEED FOR THESE SERVICES FURNISHED UNDER        THIS PLAN OF TREATMENT AND WHILE UNDER MY CARE     (Physician co-signature of this document indicates review and certification of the therapy plan).                   Certification date from: 05/11/21 Certification date to: 08/11/21          Referring Physician: Jessica MCCAULEY ([facility])    Initial Assessment        See Epic Evaluation Start Of Care Date: 05/11/21

## 2021-05-24 DIAGNOSIS — Z11.59 ENCOUNTER FOR SCREENING FOR OTHER VIRAL DISEASES: ICD-10-CM

## 2021-05-26 ENCOUNTER — HOSPITAL ENCOUNTER (OUTPATIENT)
Dept: SPEECH THERAPY | Facility: CLINIC | Age: 86
Setting detail: THERAPIES SERIES
End: 2021-05-26
Attending: PHYSICIAN ASSISTANT
Payer: MEDICARE

## 2021-05-26 PROCEDURE — 92526 ORAL FUNCTION THERAPY: CPT | Mod: GN | Performed by: SPEECH-LANGUAGE PATHOLOGIST

## 2021-06-08 DIAGNOSIS — Z11.59 ENCOUNTER FOR SCREENING FOR OTHER VIRAL DISEASES: ICD-10-CM

## 2021-06-08 LAB
LABORATORY COMMENT REPORT: NORMAL
SARS-COV-2 RNA RESP QL NAA+PROBE: NEGATIVE
SARS-COV-2 RNA RESP QL NAA+PROBE: NORMAL
SPECIMEN SOURCE: NORMAL
SPECIMEN SOURCE: NORMAL

## 2021-06-08 PROCEDURE — U0005 INFEC AGEN DETEC AMPLI PROBE: HCPCS | Performed by: INTERNAL MEDICINE

## 2021-06-08 PROCEDURE — U0003 INFECTIOUS AGENT DETECTION BY NUCLEIC ACID (DNA OR RNA); SEVERE ACUTE RESPIRATORY SYNDROME CORONAVIRUS 2 (SARS-COV-2) (CORONAVIRUS DISEASE [COVID-19]), AMPLIFIED PROBE TECHNIQUE, MAKING USE OF HIGH THROUGHPUT TECHNOLOGIES AS DESCRIBED BY CMS-2020-01-R: HCPCS | Performed by: INTERNAL MEDICINE

## 2021-06-10 ENCOUNTER — HOSPITAL ENCOUNTER (OUTPATIENT)
Dept: SPEECH THERAPY | Facility: CLINIC | Age: 86
Setting detail: THERAPIES SERIES
End: 2021-06-10
Attending: PHYSICIAN ASSISTANT
Payer: MEDICARE

## 2021-06-10 PROCEDURE — 92526 ORAL FUNCTION THERAPY: CPT | Mod: GN | Performed by: SPEECH-LANGUAGE PATHOLOGIST

## 2021-06-11 ENCOUNTER — HOSPITAL ENCOUNTER (OUTPATIENT)
Facility: CLINIC | Age: 86
Discharge: HOME | End: 2021-06-11
Attending: INTERNAL MEDICINE | Admitting: INTERNAL MEDICINE
Payer: MEDICARE

## 2021-06-11 VITALS
HEART RATE: 65 BPM | BODY MASS INDEX: 24.14 KG/M2 | SYSTOLIC BLOOD PRESSURE: 107 MMHG | HEIGHT: 69 IN | DIASTOLIC BLOOD PRESSURE: 74 MMHG | OXYGEN SATURATION: 96 % | TEMPERATURE: 97.7 F | WEIGHT: 163 LBS | RESPIRATION RATE: 16 BRPM

## 2021-06-11 LAB — UPPER GI ENDOSCOPY: NORMAL

## 2021-06-11 PROCEDURE — 250N000011 HC RX IP 250 OP 636: Performed by: INTERNAL MEDICINE

## 2021-06-11 PROCEDURE — 43248 EGD GUIDE WIRE INSERTION: CPT | Performed by: INTERNAL MEDICINE

## 2021-06-11 PROCEDURE — 999N000099 HC STATISTIC MODERATE SEDATION < 10 MIN: Performed by: INTERNAL MEDICINE

## 2021-06-11 PROCEDURE — 250N000009 HC RX 250: Performed by: INTERNAL MEDICINE

## 2021-06-11 RX ORDER — LIDOCAINE 40 MG/G
CREAM TOPICAL
Status: DISCONTINUED | OUTPATIENT
Start: 2021-06-11 | End: 2021-06-11 | Stop reason: HOSPADM

## 2021-06-11 RX ORDER — NALOXONE HYDROCHLORIDE 0.4 MG/ML
0.4 INJECTION, SOLUTION INTRAMUSCULAR; INTRAVENOUS; SUBCUTANEOUS
Status: CANCELLED | OUTPATIENT
Start: 2021-06-11

## 2021-06-11 RX ORDER — ONDANSETRON 4 MG/1
4 TABLET, ORALLY DISINTEGRATING ORAL EVERY 6 HOURS PRN
Status: CANCELLED | OUTPATIENT
Start: 2021-06-11

## 2021-06-11 RX ORDER — PROCHLORPERAZINE MALEATE 5 MG
5 TABLET ORAL EVERY 6 HOURS PRN
Status: CANCELLED | OUTPATIENT
Start: 2021-06-11

## 2021-06-11 RX ORDER — ONDANSETRON 2 MG/ML
4 INJECTION INTRAMUSCULAR; INTRAVENOUS EVERY 6 HOURS PRN
Status: CANCELLED | OUTPATIENT
Start: 2021-06-11

## 2021-06-11 RX ORDER — NALOXONE HYDROCHLORIDE 0.4 MG/ML
0.2 INJECTION, SOLUTION INTRAMUSCULAR; INTRAVENOUS; SUBCUTANEOUS
Status: CANCELLED | OUTPATIENT
Start: 2021-06-11

## 2021-06-11 RX ORDER — ONDANSETRON 2 MG/ML
4 INJECTION INTRAMUSCULAR; INTRAVENOUS
Status: DISCONTINUED | OUTPATIENT
Start: 2021-06-11 | End: 2021-06-11 | Stop reason: HOSPADM

## 2021-06-11 RX ORDER — FLUMAZENIL 0.1 MG/ML
0.2 INJECTION, SOLUTION INTRAVENOUS
Status: CANCELLED | OUTPATIENT
Start: 2021-06-11 | End: 2021-06-11

## 2021-06-11 RX ORDER — FENTANYL CITRATE 50 UG/ML
INJECTION, SOLUTION INTRAMUSCULAR; INTRAVENOUS PRN
Status: COMPLETED | OUTPATIENT
Start: 2021-06-11 | End: 2021-06-11

## 2021-06-11 RX ADMIN — FENTANYL CITRATE 50 MCG: 50 INJECTION, SOLUTION INTRAMUSCULAR; INTRAVENOUS at 10:21

## 2021-06-11 RX ADMIN — MIDAZOLAM 1 MG: 1 INJECTION INTRAMUSCULAR; INTRAVENOUS at 10:25

## 2021-06-11 RX ADMIN — TOPICAL ANESTHETIC 1 SPRAY: 200 SPRAY DENTAL; PERIODONTAL at 10:21

## 2021-06-11 RX ADMIN — MIDAZOLAM 1 MG: 1 INJECTION INTRAMUSCULAR; INTRAVENOUS at 10:21

## 2021-06-11 ASSESSMENT — MIFFLIN-ST. JEOR: SCORE: 1424.74

## 2021-06-24 ENCOUNTER — HOSPITAL ENCOUNTER (OUTPATIENT)
Dept: SPEECH THERAPY | Facility: CLINIC | Age: 86
Setting detail: THERAPIES SERIES
End: 2021-06-24
Attending: PHYSICIAN ASSISTANT
Payer: MEDICARE

## 2021-06-24 PROCEDURE — 92526 ORAL FUNCTION THERAPY: CPT | Mod: GN | Performed by: SPEECH-LANGUAGE PATHOLOGIST

## 2021-07-08 ENCOUNTER — HOSPITAL ENCOUNTER (OUTPATIENT)
Dept: SPEECH THERAPY | Facility: CLINIC | Age: 86
Setting detail: THERAPIES SERIES
End: 2021-07-08
Attending: PHYSICIAN ASSISTANT
Payer: MEDICARE

## 2021-07-08 PROCEDURE — 92526 ORAL FUNCTION THERAPY: CPT | Mod: GN | Performed by: SPEECH-LANGUAGE PATHOLOGIST

## 2021-07-08 NOTE — PROGRESS NOTES
Outpatient Speech Language Pathology Discharge Note     Patient: Shaan Espinoza  : 1935    Beginning/End Dates of Reporting Period:  2021 to 2021    Referring Provider: Jessica MCCAULEY    Therapy Diagnosis: Dysphagia    Client Self Report: Shaan Espinoza is a 85 year old y.o.male with difficulty swallowing.  Please refer to the initial outpatient speech-language pathology videoswallow study evaluation dated 2021 for further background information.  Patient has been seen for 5 visits since the start of care addressing his oral-pharyngeal swallow.  Pt reports completing his home program/daily exercises and has been tracking on a notecard. He  reports swallowing solids has been easier.  He reports being able to complete all of his exercises and is tolerating regular textured foods and thin liquids.  He is ready for discharge today.     Objective Measurements:   Goals:  Goal Identifier goal 1   Goal Description Pt will complete oropharyngeal exercises targetting BOT retraction, hyolaryngeal elevation/excursion/ epiglottic inversion and UES relaxation x20+ independently using only cues from written handout   Target Date 21   Date Met   21   Progress: Goal met.      Goal Identifier goal 2   Goal Description Pt will utilize swallow strategies across PO consumption with no more than 2 verbal reminders.   Target Date 21   Date Met   21   Progress: Goal met.      Progress Toward Goals:    Progress this reporting period: Mr. Espinoza has met his goals, is tolerating a regular diet with thin liquids and is ready for discharge today.     Plan:  Discharge from therapy.    Discharge:    Reason for Discharge: Patient has met all goals.  No further expectation of progress.    Discharge Plan: Patient to continue home program.

## 2021-09-18 ENCOUNTER — HEALTH MAINTENANCE LETTER (OUTPATIENT)
Age: 86
End: 2021-09-18

## 2022-03-05 ENCOUNTER — HEALTH MAINTENANCE LETTER (OUTPATIENT)
Age: 87
End: 2022-03-05

## 2022-07-15 ENCOUNTER — APPOINTMENT (OUTPATIENT)
Dept: CT IMAGING | Facility: CLINIC | Age: 87
End: 2022-07-15
Attending: EMERGENCY MEDICINE
Payer: MEDICARE

## 2022-07-15 ENCOUNTER — HOSPITAL ENCOUNTER (EMERGENCY)
Facility: CLINIC | Age: 87
Discharge: HOME | End: 2022-07-16
Attending: EMERGENCY MEDICINE | Admitting: EMERGENCY MEDICINE
Payer: MEDICARE

## 2022-07-15 DIAGNOSIS — R11.0 NAUSEA: ICD-10-CM

## 2022-07-15 DIAGNOSIS — K59.00 CONSTIPATION, UNSPECIFIED CONSTIPATION TYPE: ICD-10-CM

## 2022-07-15 LAB
ALBUMIN SERPL-MCNC: 3.8 G/DL (ref 3.4–5)
ALBUMIN UR-MCNC: 30 MG/DL
ALP SERPL-CCNC: 79 U/L (ref 40–150)
ALT SERPL W P-5'-P-CCNC: 28 U/L (ref 0–70)
ANION GAP SERPL CALCULATED.3IONS-SCNC: 8 MMOL/L (ref 3–14)
APPEARANCE UR: CLEAR
AST SERPL W P-5'-P-CCNC: 27 U/L (ref 0–45)
BACTERIA #/AREA URNS HPF: ABNORMAL /HPF
BASOPHILS # BLD AUTO: 0 10E3/UL (ref 0–0.2)
BASOPHILS NFR BLD AUTO: 0 %
BILIRUB SERPL-MCNC: 1.1 MG/DL (ref 0.2–1.3)
BILIRUB UR QL STRIP: NEGATIVE
BUN SERPL-MCNC: 24 MG/DL (ref 7–30)
CALCIUM SERPL-MCNC: 8.6 MG/DL (ref 8.5–10.1)
CHLORIDE BLD-SCNC: 103 MMOL/L (ref 94–109)
CO2 SERPL-SCNC: 22 MMOL/L (ref 20–32)
COLOR UR AUTO: YELLOW
CREAT SERPL-MCNC: 0.91 MG/DL (ref 0.66–1.25)
EOSINOPHIL # BLD AUTO: 0 10E3/UL (ref 0–0.7)
EOSINOPHIL NFR BLD AUTO: 1 %
ERYTHROCYTE [DISTWIDTH] IN BLOOD BY AUTOMATED COUNT: 12.7 % (ref 10–15)
GFR SERPL CREATININE-BSD FRML MDRD: 83 ML/MIN/1.73M2
GLUCOSE BLD-MCNC: 115 MG/DL (ref 70–99)
GLUCOSE UR STRIP-MCNC: NEGATIVE MG/DL
HCT VFR BLD AUTO: 42.1 % (ref 40–53)
HGB BLD-MCNC: 14 G/DL (ref 13.3–17.7)
HGB UR QL STRIP: NEGATIVE
HOLD SPECIMEN: NORMAL
HOLD SPECIMEN: NORMAL
IMM GRANULOCYTES # BLD: 0 10E3/UL
IMM GRANULOCYTES NFR BLD: 0 %
KETONES UR STRIP-MCNC: NEGATIVE MG/DL
LACTATE SERPL-SCNC: 1.4 MMOL/L (ref 0.7–2)
LEUKOCYTE ESTERASE UR QL STRIP: NEGATIVE
LYMPHOCYTES # BLD AUTO: 1.1 10E3/UL (ref 0.8–5.3)
LYMPHOCYTES NFR BLD AUTO: 13 %
MCH RBC QN AUTO: 32.8 PG (ref 26.5–33)
MCHC RBC AUTO-ENTMCNC: 33.3 G/DL (ref 31.5–36.5)
MCV RBC AUTO: 99 FL (ref 78–100)
MONOCYTES # BLD AUTO: 1.2 10E3/UL (ref 0–1.3)
MONOCYTES NFR BLD AUTO: 14 %
MUCOUS THREADS #/AREA URNS LPF: PRESENT /LPF
NEUTROPHILS # BLD AUTO: 6.3 10E3/UL (ref 1.6–8.3)
NEUTROPHILS NFR BLD AUTO: 72 %
NITRATE UR QL: NEGATIVE
NRBC # BLD AUTO: 0 10E3/UL
NRBC BLD AUTO-RTO: 0 /100
PH UR STRIP: 5.5 [PH] (ref 5–7)
PLATELET # BLD AUTO: 186 10E3/UL (ref 150–450)
POTASSIUM BLD-SCNC: 3.6 MMOL/L (ref 3.4–5.3)
PROT SERPL-MCNC: 7 G/DL (ref 6.8–8.8)
RBC # BLD AUTO: 4.27 10E6/UL (ref 4.4–5.9)
RBC URINE: 1 /HPF
SODIUM SERPL-SCNC: 133 MMOL/L (ref 133–144)
SP GR UR STRIP: 1.03 (ref 1–1.03)
SQUAMOUS EPITHELIAL: <1 /HPF
UROBILINOGEN UR STRIP-MCNC: NORMAL MG/DL
WBC # BLD AUTO: 8.6 10E3/UL (ref 4–11)
WBC URINE: 1 /HPF

## 2022-07-15 PROCEDURE — 250N000011 HC RX IP 250 OP 636: Performed by: EMERGENCY MEDICINE

## 2022-07-15 PROCEDURE — 250N000009 HC RX 250: Performed by: EMERGENCY MEDICINE

## 2022-07-15 PROCEDURE — 93005 ELECTROCARDIOGRAM TRACING: CPT

## 2022-07-15 PROCEDURE — 99285 EMERGENCY DEPT VISIT HI MDM: CPT | Mod: 25

## 2022-07-15 PROCEDURE — G1010 CDSM STANSON: HCPCS

## 2022-07-15 PROCEDURE — 85004 AUTOMATED DIFF WBC COUNT: CPT | Performed by: EMERGENCY MEDICINE

## 2022-07-15 PROCEDURE — 36415 COLL VENOUS BLD VENIPUNCTURE: CPT | Performed by: EMERGENCY MEDICINE

## 2022-07-15 PROCEDURE — 83605 ASSAY OF LACTIC ACID: CPT | Performed by: EMERGENCY MEDICINE

## 2022-07-15 PROCEDURE — 82435 ASSAY OF BLOOD CHLORIDE: CPT | Performed by: EMERGENCY MEDICINE

## 2022-07-15 PROCEDURE — 85025 COMPLETE CBC W/AUTO DIFF WBC: CPT | Performed by: EMERGENCY MEDICINE

## 2022-07-15 PROCEDURE — 96374 THER/PROPH/DIAG INJ IV PUSH: CPT | Mod: 59

## 2022-07-15 PROCEDURE — 81001 URINALYSIS AUTO W/SCOPE: CPT | Performed by: EMERGENCY MEDICINE

## 2022-07-15 PROCEDURE — 80053 COMPREHEN METABOLIC PANEL: CPT | Performed by: EMERGENCY MEDICINE

## 2022-07-15 RX ORDER — IOPAMIDOL 755 MG/ML
500 INJECTION, SOLUTION INTRAVASCULAR ONCE
Status: COMPLETED | OUTPATIENT
Start: 2022-07-15 | End: 2022-07-15

## 2022-07-15 RX ORDER — SODIUM CHLORIDE 9 MG/ML
INJECTION, SOLUTION INTRAVENOUS CONTINUOUS
Status: DISCONTINUED | OUTPATIENT
Start: 2022-07-15 | End: 2022-07-16 | Stop reason: HOSPADM

## 2022-07-15 RX ORDER — ONDANSETRON 2 MG/ML
4 INJECTION INTRAMUSCULAR; INTRAVENOUS ONCE
Status: COMPLETED | OUTPATIENT
Start: 2022-07-15 | End: 2022-07-15

## 2022-07-15 RX ADMIN — SODIUM CHLORIDE 60 ML: 9 INJECTION, SOLUTION INTRAVENOUS at 22:52

## 2022-07-15 RX ADMIN — IOPAMIDOL 75 ML: 755 INJECTION, SOLUTION INTRAVENOUS at 22:52

## 2022-07-15 RX ADMIN — ONDANSETRON 4 MG: 2 INJECTION INTRAMUSCULAR; INTRAVENOUS at 19:31

## 2022-07-16 VITALS
RESPIRATION RATE: 18 BRPM | BODY MASS INDEX: 23.78 KG/M2 | SYSTOLIC BLOOD PRESSURE: 123 MMHG | DIASTOLIC BLOOD PRESSURE: 71 MMHG | TEMPERATURE: 97.4 F | HEART RATE: 89 BPM | OXYGEN SATURATION: 97 % | WEIGHT: 161 LBS

## 2022-07-16 RX ORDER — ONDANSETRON 4 MG/1
4 TABLET, ORALLY DISINTEGRATING ORAL EVERY 6 HOURS PRN
Qty: 10 TABLET | Refills: 0 | Status: SHIPPED | OUTPATIENT
Start: 2022-07-16 | End: 2022-07-19

## 2022-07-16 RX ORDER — POLYETHYLENE GLYCOL 3350 17 G/17G
1 POWDER, FOR SOLUTION ORAL DAILY
Qty: 527 G | Refills: 0 | Status: SHIPPED | OUTPATIENT
Start: 2022-07-16 | End: 2022-08-16

## 2022-07-16 NOTE — ED TRIAGE NOTES
Pt reports 3 months of weakness since covid, increasing weakness and vomiting today. Now 8-10 episodes of vomiting

## 2022-07-16 NOTE — DISCHARGE INSTRUCTIONS
Push fluids and increase fiber intake in your diet.  You can also start taking MiraLAX to help with constipation.  Zofran may be used for nausea.  Follow-up with your regular doctor next week.  Return if symptoms worsen if you have high fever, severe vomiting or severe abdominal pain

## 2022-07-16 NOTE — ED PROVIDER NOTES
History     Chief Complaint:  Generalized Weakness       [facility]   Shaan Espinoza is a 86 year old male who presents with severe nausea and vomiting and generalized weakness.  Patient tested positive for COVID April 23 in [place].  He received Paxlovid shortly after diagnosis and started next day.  He stated that COVID has responded to that but he was left with severe weakness as well as nausea.  He also has a lot of cramping in his both legs as well as knees.  He has been seeing his primary doctor for the cramping and the pain but physical therapy and steroids are not helping.  He has not seen orthopedic doctor for this yet.  He does have a history ileus with severe abdominal pain and nausea.  He had 2 episodes this past month.  He started having nausea and vomiting the last 2 days.  The nausea is resolved after Zofran.  He states that this episode seems different than the other ones as he does not have any abdominal pain.  He denies any other sick contacts.  No other recent travel.  He denies any chest pain or shortness of breath or cough.  He has not had any diarrhea.  He denies any headache or visual changes.    ROS:  Review of Systems  Please see HPI. All other systems reviewed and negative.       Allergies:  No Known Allergies     Medications:    calcium carbonate (OS-RISSA 500 MG [place]. [place]) 500 MG tablet  calcium carbonate-vitamin D (OSCAL W/D) 500-200 MG-UNIT tablet  cycloSPORINE (RESTASIS OP)  latanoprost (XALATAN) 0.005 % ophthalmic solution  levothyroxine (SYNTHROID/LEVOTHROID) 25 MCG tablet  mesalamine (PENTASA) 500 MG CPCR CR capsule  mesalamine (PENTASA) 500 MG CPCR CR capsule  psyllium (METAMUCIL) 58.6 % packet        Past Medical History:    Past Medical History:   Diagnosis Date     Colitis      Ileus (H)      Neuropathy      Thyroid disease        Past Surgical History:    Past Surgical History:   Procedure Laterality Date     COLONOSCOPY N/A 8/1/2018    Procedure: COMBINED COLONOSCOPY, SINGLE OR  MULTIPLE BIOPSY/POLYPECTOMY BY BIOPSY;  Colonoscopy cold forceps biopsies;  Surgeon: Carlos Whaley MD;  Location:  GI     ORTHOPEDIC SURGERY          Family History:    family history includes Cancer in his maternal grandfather; Lung Cancer in his brother; Ovarian Cancer in his mother; Stomach Cancer in his maternal grandmother.    Social History:   reports that he quit smoking about 59 years ago. He has never used smokeless tobacco. He reports current alcohol use. He reports that he does not use drugs.  PCP: Pablo Jolley   Here with his wife  Physical Exam     Patient Vitals for the past 24 hrs:   BP Temp Temp src Pulse Resp SpO2 Weight   07/15/22 1924 132/71 98.5  F (36.9  C) Temporal 90 18 96 % 73 kg (161 lb)        Physical Exam  Constitutional:       Appearance: He is well-developed.   HENT:      Right Ear: External ear normal.      Left Ear: External ear normal.      Mouth/Throat:      Mouth: Mucous membranes are moist.      Pharynx: Oropharynx is clear. No oropharyngeal exudate.   Eyes:      General: No scleral icterus.     Conjunctiva/sclera: Conjunctivae normal.      Pupils: Pupils are equal, round, and reactive to light.   Cardiovascular:      Rate and Rhythm: Normal rate and regular rhythm.      Heart sounds: Normal heart sounds. No murmur heard.    No friction rub. No gallop.   Pulmonary:      Effort: Pulmonary effort is normal. No respiratory distress.      Breath sounds: Normal breath sounds. No wheezing or rales.   Abdominal:      General: Bowel sounds are normal. There is no distension.      Palpations: Abdomen is soft. There is no mass.      Tenderness: There is no abdominal tenderness.   Musculoskeletal:         General: Normal range of motion.   Skin:     General: Skin is warm and dry.      Capillary Refill: Capillary refill takes less than 2 seconds.      Findings: No rash.   Neurological:      General: No focal deficit present.      Mental Status: He is alert.      Cranial Nerves: No  cranial nerve deficit.         Emergency Department Course   ECG:  NSR 81bpm, no acute ST changes, normal interval  Imaging:  CT Abdomen Pelvis w Contrast   Final Result   IMPRESSION:    1.  Moderate amount of stool seen throughout the colon with a large stool ball seen in the rectum, concerning for constipation. There is no evidence of small or large bowel obstruction seen at this time.   2.  Normal appendix   3.  Small hiatal hernia with thickening of the distal esophagus, concerning for refluxing GERD, findings may be exaggerated due to history of recent vomiting.         Report per radiology    Laboratory:  Labs Ordered and Resulted from Time of ED Arrival to Time of ED Departure   COMPREHENSIVE METABOLIC PANEL - Abnormal       Result Value    Sodium 133      Potassium 3.6      Chloride 103      Carbon Dioxide (CO2) 22      Anion Gap 8      Urea Nitrogen 24      Creatinine 0.91      Calcium 8.6      Glucose 115 (*)     Alkaline Phosphatase 79      AST 27      ALT 28      Protein Total 7.0      Albumin 3.8      Bilirubin Total 1.1      GFR Estimate 83     CBC WITH PLATELETS AND DIFFERENTIAL - Abnormal    WBC Count 8.6      RBC Count 4.27 (*)     Hemoglobin 14.0      Hematocrit 42.1      MCV 99      MCH 32.8      MCHC 33.3      RDW 12.7      Platelet Count 186      % Neutrophils 72      % Lymphocytes 13      % Monocytes 14      % Eosinophils 1      % Basophils 0      % Immature Granulocytes 0      NRBCs per 100 WBC 0      Absolute Neutrophils 6.3      Absolute Lymphocytes 1.1      Absolute Monocytes 1.2      Absolute Eosinophils 0.0      Absolute Basophils 0.0      Absolute Immature Granulocytes 0.0      Absolute NRBCs 0.0     ROUTINE UA WITH MICROSCOPIC REFLEX TO CULTURE - Abnormal    Color Urine Yellow      Appearance Urine Clear      Glucose Urine Negative      Bilirubin Urine Negative      Ketones Urine Negative      Specific Gravity Urine 1.031      Blood Urine Negative      pH Urine 5.5      Protein Albumin  Urine 30  (*)     Urobilinogen Urine Normal      Nitrite Urine Negative      Leukocyte Esterase Urine Negative      Bacteria Urine Few (*)     Mucus Urine Present (*)     RBC Urine 1      WBC Urine 1      Squamous Epithelials Urine <1     LACTIC ACID WHOLE BLOOD - Normal    Lactic Acid 1.4          Emergency Department Course:     Reviewed:  I reviewed nursing notes, vitals, past medical history and Care Everywhere    Assessments:  1931 I obtained history and examined the patient as noted above.   2030 I rechecked the patient and explained findings    Interventions:  Medications   0.9% sodium chloride BOLUS (has no administration in time range)     Followed by   sodium chloride 0.9% infusion (has no administration in time range)   ondansetron (ZOFRAN) injection 4 mg (4 mg Intravenous Given 7/15/22 1931)   CT saline flush (60 mLs Intravenous Given 7/15/22 2252)   iopamidol (ISOVUE-370) solution 500 mL (75 mLs Intravenous Given 7/15/22 2252)        Disposition:  The patient was discharged to home.     Impression & Plan      Medical Decision Making:  Patient presents today for evaluation of nausea and multiple other symptoms.  He did have COVID in April and it seems like he still struggling with post-COVID symptoms.  I did recommend that he follow-up with his doctor for evaluation for long COVID syndrome.  CT today showed likely constipation.  Patient states that its been 3 days since he had a bowel movement.  He does admit to being prone to constipation.  I recommended that he continue with bland diet and start taking MiraLAX.  He agrees with that.  There are no signs of bowel obstruction or ileus.  I did prescribe Zofran for nausea as needed.  He passed a p.o. challenge.  His symptoms of nausea resolved after 1 dose of oral Zofran here.  I recommended that he follow-up with his regular doctor.  He will need further evaluation and monitoring for his long COVID symptoms.  Return precautions provided.  Patient  discharged in stable improved condition.    Diagnosis:    ICD-10-CM    1. Nausea  R11.0    2. Constipation, unspecified constipation type  K59.00         Discharge Medications:  New Prescriptions    ONDANSETRON (ZOFRAN ODT) 4 MG ODT TAB    Take 1 tablet (4 mg) by mouth every 6 hours as needed for nausea    POLYETHYLENE GLYCOL (MIRALAX) 17 GM/DOSE POWDER    Take 17 g (1 capful) by mouth daily for 31 days        7/15/2022   Freya Alanis MD Cheng, [place], MD  07/16/22 0015

## 2022-07-18 LAB
ATRIAL RATE - MUSE: 81 BPM
DIASTOLIC BLOOD PRESSURE - MUSE: NORMAL MMHG
INTERPRETATION ECG - MUSE: NORMAL
P AXIS - MUSE: 59 DEGREES
PR INTERVAL - MUSE: 148 MS
QRS DURATION - MUSE: 108 MS
QT - MUSE: 392 MS
QTC - MUSE: 455 MS
R AXIS - MUSE: -16 DEGREES
SYSTOLIC BLOOD PRESSURE - MUSE: NORMAL MMHG
T AXIS - MUSE: 38 DEGREES
VENTRICULAR RATE- MUSE: 81 BPM

## 2022-11-20 ENCOUNTER — HEALTH MAINTENANCE LETTER (OUTPATIENT)
Age: 87
End: 2022-11-20

## 2023-04-15 ENCOUNTER — HEALTH MAINTENANCE LETTER (OUTPATIENT)
Age: 88
End: 2023-04-15

## 2024-06-22 ENCOUNTER — HEALTH MAINTENANCE LETTER (OUTPATIENT)
Age: 89
End: 2024-06-22

## 2024-07-05 ENCOUNTER — TRANSFERRED RECORDS (OUTPATIENT)
Dept: HEALTH INFORMATION MANAGEMENT | Facility: CLINIC | Age: 89
End: 2024-07-05

## 2024-10-22 ENCOUNTER — TRANSFERRED RECORDS (OUTPATIENT)
Dept: HEALTH INFORMATION MANAGEMENT | Facility: CLINIC | Age: 89
End: 2024-10-22
Payer: MEDICARE

## 2024-10-22 LAB
ALT SERPL-CCNC: <4 U/L (ref 4–50)
AST SERPL-CCNC: 22 U/L (ref 12–35)
CREATININE (EXTERNAL): 0.9 MG/DL (ref 0.5–1.5)
GLUCOSE (EXTERNAL): 95 MG/DL (ref 60–115)
POTASSIUM (EXTERNAL): 3.8 MMOL/L (ref 3.6–5.1)

## 2024-10-29 ENCOUNTER — TRANSCRIBE ORDERS (OUTPATIENT)
Dept: OTHER | Age: 89
End: 2024-10-29

## 2024-10-29 ENCOUNTER — MEDICAL CORRESPONDENCE (OUTPATIENT)
Dept: HEALTH INFORMATION MANAGEMENT | Facility: CLINIC | Age: 89
End: 2024-10-29
Payer: MEDICARE

## 2024-10-29 DIAGNOSIS — R13.19 OTHER DYSPHAGIA: Primary | ICD-10-CM

## 2024-10-29 DIAGNOSIS — R13.10 DYSPHAGIA, UNSPECIFIED: ICD-10-CM

## 2025-01-23 ENCOUNTER — TRANSCRIBE ORDERS (OUTPATIENT)
Dept: OTHER | Age: 89
End: 2025-01-23

## 2025-01-23 DIAGNOSIS — R13.12 OROPHARYNGEAL DYSPHAGIA: Primary | ICD-10-CM

## 2025-02-28 ENCOUNTER — HOSPITAL ENCOUNTER (OUTPATIENT)
Dept: GENERAL RADIOLOGY | Facility: CLINIC | Age: 89
Discharge: HOME | End: 2025-02-28
Attending: INTERNAL MEDICINE
Payer: MEDICARE

## 2025-02-28 ENCOUNTER — HOSPITAL ENCOUNTER (OUTPATIENT)
Dept: SPEECH THERAPY | Facility: CLINIC | Age: 89
Discharge: HOME | End: 2025-02-28
Attending: INTERNAL MEDICINE
Payer: MEDICARE

## 2025-02-28 DIAGNOSIS — R13.12 OROPHARYNGEAL DYSPHAGIA: ICD-10-CM

## 2025-02-28 PROCEDURE — 92610 EVALUATE SWALLOWING FUNCTION: CPT | Mod: GN

## 2025-02-28 PROCEDURE — 92526 ORAL FUNCTION THERAPY: CPT | Mod: GN

## 2025-02-28 PROCEDURE — 74230 X-RAY XM SWLNG FUNCJ C+: CPT

## 2025-02-28 RX ORDER — BARIUM SULFATE 400 MG/ML
SUSPENSION ORAL ONCE
Status: COMPLETED | OUTPATIENT
Start: 2025-02-28 | End: 2025-02-28

## 2025-02-28 RX ADMIN — BARIUM SULFATE: 400 SUSPENSION ORAL at 11:23

## 2025-02-28 NOTE — PROGRESS NOTES
SPEECH LANGUAGE PATHOLOGY EVALUATION              Subjective        Presenting condition or subjective complaint:   progressive dysphagia with concerns for aspiration of liquids and food feeling stuck  Date of onset:      Relevant medical history:      Dysphagia, Parkinson's Disease   Colitis      Ileus (H)      Neuropathy      Thyroid disease      Prior diagnostic imaging/testing results:      Previous VFSS May 2021 with aspiration of thin consistency  Prior therapy history for the same diagnosis, illness or injury:     Worked with OP SLP for dysphagia in 2021, also has seen SLP for LSVT.    Patient goals for therapy:   Pt states he does not want to get pneumonia.     Objective     SWALLOW EVALUTION  Dysphagia history: Progressive dysphagia related to PD. Pt typically eats a regular diet/thin liquids but reports difficulty swallowing both solids and liquids.  Current Diet/Method of Nutritional Intake: thin liquids (level 0), regular diet        VIDEOFLUOROSCOPIC SWALLOW STUDY  Radiologist: Dr. Conner  Views Taken: left lateral   Physical location of procedure: [facility]  Patient sitting upright on chair/stool     VFSS textures trialed:   VFSS Eval: Thin Liquids  Mode of Presentation: cup, self-fed   Order of Presentation: 1,2,3,4  Preparatory Phase: WFL  Oral Phase: premature pharyngeal entry  Bolus Location When Swallow Initiated: valleculae  Pharyngeal Phase: impaired hyolaryngel excursion, impaired epiglottic movement, impaired pharyngoesophageal segment opening, impaired tongue base retraction, pharyngeal wall coating, residue in valleculae, residue in pyriform sinus  Rosenbeck's Penetration Aspiration Scale: 8 - contrast passes glottis, visible subglottic residue remains, absent patient response (aspiration)  Response to Aspiration: absent response, productive cued cough  Strategies and Compensations:  chin tuck NOT effective  Diagnostic Statement: moderate residue in the  valleculae and pyriform sinus, silent aspiration of thin consistency. Cued cough effective for expelling barium from the laryngeal vestibule.     VFSS Eval: Slightly Thick Liquids   Mode of Presentation: cup, self-fed   Order of Presentation: 5  Preparatory Phase: WFL  Oral Phase: premature pharyngeal entry  Bolus Location When Swallow Initiated: valleculae  Pharyngeal Phase: impaired hyolaryngel excursion, impaired epiglottic movement, impaired pharyngoesophageal segment opening, impaired tongue base retraction, pharyngeal wall coating, residue in valleculae, residue in pyriform sinus  Rosenbeck's Penetration Aspiration Scale: 8 - contrast passes glottis, visible subglottic residue remains, absent patient response (aspiration)  Response to Aspiration: absent response, productive cued cough  Diagnostic Statement: moderate residue in the valleculae and pyriform sinus, silent aspiration of thin consistency. Cued cough effective for expelling barium from the laryngeal vestibule.     VFSS Eval: Mildly Thick Liquids  Mode of Presentation: cup, self-fed   Order of Presentation: 7,8,10,11  Preparatory Phase: WFL  Oral Phase: premature pharyngeal entry  Bolus Location When Swallow Initiated: valleculae  Pharyngeal Phase: impaired hyolaryngel excursion, impaired epiglottic movement, impaired pharyngoesophageal segment opening, impaired tongue base retraction, pharyngeal wall coating, residue in valleculae, residue in pyriform sinus  Rosenbeck's Penetration Aspiration Scale: 4 - contrast contacts vocal cords, no residue remains (penetration)  Diagnostic Statement: moderate residue in the valleculae and pyriform sinus, penetration with residue in the laryngeal vestibule. Cued cough effective. At risk for aspiration after the swallow.    VFSS Eval: Purees  Mode of Presentation: spoon, self-fed   Order of Presentation: 9  Preparatory Phase: WFL  Oral Phase: premature pharyngeal entry  Bolus Location When Swallow Initiated:  valleculae  Pharyngeal Phase: impaired hyolaryngel excursion, impaired epiglottic movement, impaired pharyngoesophageal segment opening, impaired tongue base retraction, pharyngeal wall coating, residue in valleculae, residue in pyriform sinus  Rosenbeck s Penetration Aspiration Scale: 3 - contrast remains above the vocal cords, visible residue remains (penetration)  Strategies and Compensations: double swallow, throat clear and repeated swallows somewhat effective. Pt able to cough up pharyngeal residue to the blue emesis bag.  Diagnostic Statement: moderate-severe residue in the valleculae and pyriform sinus, penetration with residue in the laryngeal vestibule. Cued cough effective. At risk for aspiration after the swallow.     ESOPHAGEAL PHASE OF SWALLOW  Poor flow into the UES - may wish to pursue GI workup      SWALLOW ASSESSMENT CLINICAL IMPRESSIONS AND RATIONALE  Diet Consistency Recommendations: mildly thick liquids (level 2), pureed (level 4), minced & moist (level 5), free water protocol     Recommended Feeding/Eating Techniques: small bolus size, alternate food and liquid intake, double swallow, effortful (hard) swallow, supraglottic swallow, maintain upright sitting position for eating, maintain upright posture during/after eating for 30 minutes   Medication Administration Recommendations: small pills served in a spoonful of applesauce or with a sip of mildly thick consistency  Instrumental Assessment Recommendations:  consider GI workup to assess any interventions that may assist with swallow effectiveness      Assessment & Plan   CLINICAL IMPRESSIONS   Medical Diagnosis:      Treatment Diagnosis:     Impression/Assessment:  Videoswallow study completed per MD order. This patient presents with moderate to severe oropharyngeal dysphagia marked by premature pharyngeal entry, poor epiglottic inversion, reduced pharyngeal excursion, penetration with eventual silent aspiration of thin and slightly thick  consistencies, and poor flow into the UES (leaving moderate volumes of residue in the valleculae and pyriform sinus). The patient is at elevated risk for aspiration after the swallow across consistencies. While aspiration was silent, a cued cough was effective and the patient was able to cough up and expectorate barium when cued.   This patient is at elevated risk for aspiration across consistencies. Cautiously recommend soft food (thoroughly chewed) served in small bites with small sips of mildly thick liquids. Recommend a swallow, cough, swallow technique. Further recommend a free water protocol which SLP educated the patient about today.      PLAN OF CARE  Treatment Interventions: Swallowing dysfunction and/or oral function for feeding    Prognosis to achieve stated therapy goals is fair   Rehab potential is impacted by: current level of function, patient motivation    Long Term Goals:          Frequency of Treatment:  eval & 1 tx, defer ongoing tx to OP SLP   Duration of Treatment:   eval & 1 tx, defer ongoing tx to OP SLP     Recommended Referrals to Other Professionals: Speech Language Pathology, recommend GI consult, recommend nutrition consult as severity of deficits limit amount of intake  Education Assessment:        Risks and benefits of evaluation/treatment have been explained.   Patient/Family/caregiver agrees with Plan of Care.     Evaluation Time:         Signing Clinician: Selene Noriega, SLP      [facility]                                                                                   OUTPATIENT SPEECH LANGUAGE PATHOLOGY      PLAN OF TREATMENT FOR OUTPATIENT REHABILITATION   Patient's Last Name, First Name, Shaan Bell YOB: 1935   Provider's Name   [facility]   Medical Record No.  [id number]     Onset Date:   1/23/2025 Start of Care Date:    2/28/25     Medical Diagnosis:     Parkinson's  Disease      SLP Treatment Diagnosis:     Oropharyngeal Dysphagia Plan of Treatment  Frequency/Duration:      /   eval & 1 tx, defer ongoing tx to OP SLP     Certification date from     2/28/25   To       5/27/25       See note for plan of treatment details and functional goals     Selene Noriega, SLP                         I CERTIFY THE NEED FOR THESE SERVICES FURNISHED UNDER        THIS PLAN OF TREATMENT AND WHILE UNDER MY CARE .             Physician Signature               Date    X_____________________________________________________                  Referring Provider:  Ekaterina Addison    Initial Assessment  See Epic Evaluation-

## 2025-07-12 ENCOUNTER — HEALTH MAINTENANCE LETTER (OUTPATIENT)
Age: 89
End: 2025-07-12

## (undated) DEVICE — Device

## (undated) DEVICE — KIT ENDO TURNOVER/PROCEDURE W/CLEAN A SCOPE LINERS 103888

## (undated) RX ORDER — FENTANYL CITRATE 50 UG/ML
INJECTION, SOLUTION INTRAMUSCULAR; INTRAVENOUS
Status: DISPENSED
Start: 2018-08-01

## (undated) RX ORDER — FENTANYL CITRATE 50 UG/ML
INJECTION, SOLUTION INTRAMUSCULAR; INTRAVENOUS
Status: DISPENSED
Start: 2021-06-11